# Patient Record
Sex: MALE | Race: WHITE | NOT HISPANIC OR LATINO | URBAN - METROPOLITAN AREA
[De-identification: names, ages, dates, MRNs, and addresses within clinical notes are randomized per-mention and may not be internally consistent; named-entity substitution may affect disease eponyms.]

---

## 2018-05-30 ENCOUNTER — TELEPHONE (OUTPATIENT)
Dept: SCHEDULING | Facility: CLINIC | Age: 53
End: 2018-05-30

## 2018-05-30 NOTE — TELEPHONE ENCOUNTER
Pt called to inform Dr. Schrader that Bystolic will no longer be covered under his insurance as of 7/1/2018.      Covered medications:     Atenolol  Carvedilol   Metoprolol Succinate   Metoprolol T[place]arte

## 2018-05-31 NOTE — TELEPHONE ENCOUNTER
Spoke to pt, Aware that I will speak to Dr. Schrader about this tomorrow when she is back in the office. He can refill his Bystolic until 7/1. He states this is no problem. Other options that ate covered are Nadolol and Propanolol.

## 2018-06-01 NOTE — TELEPHONE ENCOUNTER
Spoke to pt, he is aware of change. He may finish what he has of bystolic 2.5mg daily and then start Toprol 50mg daily when done.    [facility] toprol xl 50mg po daily, #90 refills 3.

## 2018-08-17 RX ORDER — METAXALONE 800 MG/1
800 TABLET ORAL 3 TIMES DAILY
COMMUNITY
Start: 2017-05-05 | End: 2018-08-17 | Stop reason: SDUPTHER

## 2018-08-20 RX ORDER — METAXALONE 800 MG/1
800 TABLET ORAL 3 TIMES DAILY
Qty: 90 TABLET | Refills: 0 | Status: SHIPPED | OUTPATIENT
Start: 2018-08-20 | End: 2018-09-23 | Stop reason: SDUPTHER

## 2018-09-25 RX ORDER — METAXALONE 800 MG/1
800 TABLET ORAL 3 TIMES DAILY
Qty: 90 TABLET | Refills: 0 | Status: SHIPPED | OUTPATIENT
Start: 2018-09-25 | End: 2018-11-03 | Stop reason: SDUPTHER

## 2018-11-05 RX ORDER — METAXALONE 800 MG/1
TABLET ORAL
Qty: 90 TABLET | Refills: 0 | Status: SHIPPED | OUTPATIENT
Start: 2018-11-05 | End: 2019-08-05

## 2018-11-29 PROBLEM — I71.20 THORACIC AORTIC ANEURYSM WITHOUT RUPTURE (CMS/HCC): Status: ACTIVE | Noted: 2018-11-29

## 2018-11-29 PROBLEM — I10 ESSENTIAL HYPERTENSION: Status: ACTIVE | Noted: 2018-11-29

## 2018-11-29 NOTE — PROGRESS NOTES
Cardiology Note    Kiah Hamilton MD          Jesus Oconnor is a 53 y.o. male 1965   Returns for follow-up visit and echocardiogram    Echocardiogram is stable normal aortic valve normal LV systolic function with  Aortic root 4.1 on today's echo was 4.3 last year no change    He has known history of dilated aortic root with CTA 2017 showing aortic root of 4.4   he is followed with Dr. Antonio due for follow-up imaging May 2019  History of hypertension    He has no known obstructive CAD with coronary calcium score of 0 in 2015    Blood pressure has not been perfect since we had a change from bystolic to metoprolol combination with amlodipine  We will check to see what the prices of bystolic return to that drug if covered if not we will continue metoprolol and increased dose of amlodipine          Patient Active Problem List    Diagnosis Date Noted   • Thoracic aortic aneurysm without rupture (CMS/HCC) (HCC) 11/29/2018   • Essential hypertension 11/29/2018       Allergy    Losartan potassium          MED LIST       Current Outpatient Prescriptions   Medication Sig Dispense Refill   • amLODIPine (NORVASC) 5 mg tablet Take 1 tablet by mouth daily.  3   • cetirizine (ZyrTEC) 10 mg tablet Take 10 mg by mouth as needed.     • metaxalone (SKELAXIN) 800 mg tablet TAKE 1 TABLET BY MOUTH THREE TIMES A DAY 90 tablet 0   • metoprolol succinate XL (TOPROL-XL) 50 mg 24 hr tablet Take 1 tablet by mouth daily.  3   • metroNIDAZOLE (METROGEL) 1 % gel Apply topically nightly.  2   • mometasone (NASONEX) 50 mcg/actuation nasal spray Administer 1 spray into affected nostril(s) daily as needed.     • PROAIR RESPICLICK 90 mcg/actuation inhaler Inhale 2 puffs every 4 (four) hours as needed.  3   • PULMICORT FLEXHALER 90 mcg/actuation inhaler Inhale 1 puff daily as needed.  5   • nebivolol (BYSTOLIC) 10 mg tablet Take 1 tablet (10 mg total) by mouth daily. 90 tablet 1     No current facility-administered medications  for this visit.    Amlodipine 5 mg  Bystolic 10 mg  Hold metoprolol 50  Metaxalone 800  Albuterol            Review of Systems   Constitution: Negative for malaise/fatigue, weight gain and weight loss.   HENT: Negative for hearing loss and hoarse voice.    Eyes: Negative for visual disturbance.   Cardiovascular: Negative for chest pain, claudication, cyanosis, dyspnea on exertion, irregular heartbeat, leg swelling, near-syncope, orthopnea, palpitations, paroxysmal nocturnal dyspnea and syncope.   Respiratory: Negative for cough, hemoptysis, shortness of breath, sleep disturbances due to breathing, snoring, sputum production and wheezing.    Endocrine: Negative for cold intolerance and heat intolerance.   Hematologic/Lymphatic: Negative.  Negative for bleeding problem. Does not bruise/bleed easily.   Skin: Negative.  Negative for rash.   Musculoskeletal: Negative for arthritis, falls, joint pain, muscle cramps and myalgias.   Gastrointestinal: Negative for abdominal pain, anorexia, change in bowel habit, constipation, diarrhea, dysphagia, heartburn, jaundice and nausea.   Genitourinary: Negative for frequency and nocturia.   Neurological: Negative for dizziness, focal weakness, headaches, light-headedness, numbness, tremors and vertigo.   Psychiatric/Behavioral: Negative for memory loss. The patient does not have insomnia and is not nervous/anxious.    Allergic/Immunologic: Negative for hives.       Labs   Lab Results   Component Value Date    WBC 6.68 02/29/2016    HGB 14.9 02/29/2016    HCT 43.4 02/29/2016     02/29/2016    CHOL 181 02/29/2016    TRIG 81 02/29/2016    HDL 58 02/29/2016    LDLCALC 107 (H) 02/29/2016    ALT 15 (L) 02/29/2016    AST 21 02/29/2016     12/16/2016    K 4.3 12/16/2016     12/16/2016    CREATININE 0.86 12/16/2016    BUN 17 02/29/2016    CO2 25 12/16/2016    TSH 3.48 02/29/2016    HGBA1C 5.0 02/29/2016       Lab Results   Component Value Date    GLUCOSE 83 12/16/2016     CALCIUM 9.4 12/16/2016     12/16/2016    K 4.3 12/16/2016    CO2 25 12/16/2016     12/16/2016    BUN 17 02/29/2016    CREATININE 0.86 12/16/2016         Objective   Vitals:    12/12/18 1537   BP: (!) 134/96   Pulse: (!) 49   Weight: 88 kg (194 lb)   Height: 1.829 m (6')     Physical Exam   Constitutional: He is oriented to person, place, and time. He appears well-developed and well-nourished. He is active.  Non-toxic appearance. He does not have a sickly appearance. He does not appear ill. No distress. He is not intubated.   HENT:   Head: Normocephalic. Not microcephalic. Head is without raccoon's eyes, without abrasion and without contusion.   Nose: Nose normal.   Eyes: EOM are normal. Left eye exhibits no discharge and no exudate. Right conjunctiva is not injected. Left conjunctiva is not injected. Left conjunctiva has no hemorrhage. No scleral icterus. Pupils are equal.   Neck: Normal range of motion. Neck supple. Normal carotid pulses and no hepatojugular reflux present. Carotid bruit is not present.   Cardiovascular: Normal rate, regular rhythm, normal heart sounds, intact distal pulses and normal pulses.  PMI is not displaced.  Exam reveals no gallop and no friction rub.    No murmur heard.  Pulmonary/Chest: Effort normal and breath sounds normal. No stridor. No apnea, no tachypnea and no bradypnea. He is not intubated. No respiratory distress. He has no wheezes. He has no rales. He exhibits no tenderness.   Abdominal: Soft. Normal appearance, normal aorta and bowel sounds are normal. He exhibits no distension. There is no tenderness.   Musculoskeletal: Normal range of motion. He exhibits no edema or deformity.   Neurological: He is alert and oriented to person, place, and time.   Skin: No abrasion, no bruising, no ecchymosis and no rash noted. He is not diaphoretic. No erythema. No pallor.   Psychiatric: He has a normal mood and affect. His mood appears not anxious. His affect is not angry, not  blunt, not labile and not inappropriate. His speech is not slurred. He is not agitated, not aggressive, not withdrawn and not combative. He does not exhibit a depressed mood. He is communicative.         Cardiac Procedures    Cardiovascular Procedures:  ECHO/MUGA:  Echo (Normal EF, ao root 3.9  redundant atrial septum) - 5/20/2016  Echo (aortic root 4.3 nl ef) - 12/18 no change      VASCULAR:  Thoracic Ao CTA (aortic rot 4.4 sinus of valsalva was 4.3 nov 2015 charley 2mm nodule no change) - 12/19/2016  CT/MRI:  Cardiac Calcium Score (Total 0:, aortic root 4.0) - 5/12/2015    Chest CT (thoracic aorta 4.3 liver cyst) - 11/2/2015    Chest CT (aortic root 4.4 ( was 4.3 11.15)no change feb 2015, 2mm charley nodule no change 2015) - 12/19/2016    Chest CT (cta 4.2 michael repeat 2 years) - 5/2017  :  EKG normal      Assessment/Plan:      Thoracic aortic aneurysm without rupture (CMS/HCC) (HCC)  Thoracic aneurysm 4.4 cm last image May 2017 follows with Dr. Antonio  Echo today stable normal aortic valve normal LV systolic function aortic root 4.1  Due for follow-up [facility] in May 2019 at [facility]  He has no known coronary disease with coronary calcium score of 0 in 2015  Blood pressure is not good enough adjusting medications outlined below    Essential hypertension  Since changing from bystolic to metoprolol blood pressure is good  He will check out the price so we will try to resume bystolic 10 and amlodipine 5  If not affordable will go back to metoprolol 50 and increase amlodipine to 10 mg follow-up in 6 weeks with lab work her blood pressure readings         Aortic root stable by echocardiogram today follow-up with CAT scan and visit with Dr. Antonio in May 2019 he is imaged every 2 years    Since we changed from bystolic to metoprolol due to cost blood pressure not as good he is here to research the cost we will try to use bystolic 10 mg in combination with amlodipine 5  If this is not affordable we will return to  metoprolol 50 increase amlodipine to 10  He will follow-up in 6 weeks we will see what he is taking and how his blood pressure is  This spring, may he will see Dr. Valdez with CTA of thoracic aorta and I will repeat echocardiogram in 1 year        Thank you for allowing me to participate in the care of this patient.  I hope this information is helpful.        Hubert Schrader MD [facility]   12/12/2018   Kiah Hamilton MD

## 2018-12-06 ENCOUNTER — TELEPHONE (OUTPATIENT)
Dept: CARDIOLOGY | Facility: CLINIC | Age: 53
End: 2018-12-06

## 2018-12-06 NOTE — TELEPHONE ENCOUNTER
I called pt to confirm ECHOOV- 12/12/18- he will be there.    He states that Dr. Schrader did not order labs at his last visit and did not have labs done recently with PCP.

## 2018-12-12 ENCOUNTER — HOSPITAL ENCOUNTER (OUTPATIENT)
Dept: CARDIOLOGY | Facility: CLINIC | Age: 53
Discharge: HOME | End: 2018-12-12
Payer: COMMERCIAL

## 2018-12-12 ENCOUNTER — OFFICE VISIT (OUTPATIENT)
Dept: CARDIOLOGY | Facility: CLINIC | Age: 53
End: 2018-12-12
Attending: INTERNAL MEDICINE
Payer: COMMERCIAL

## 2018-12-12 VITALS — BODY MASS INDEX: 25.06 KG/M2 | HEIGHT: 72 IN | WEIGHT: 185 LBS

## 2018-12-12 VITALS
SYSTOLIC BLOOD PRESSURE: 134 MMHG | DIASTOLIC BLOOD PRESSURE: 96 MMHG | WEIGHT: 194 LBS | BODY MASS INDEX: 26.28 KG/M2 | HEART RATE: 49 BPM | HEIGHT: 72 IN

## 2018-12-12 DIAGNOSIS — I10 ESSENTIAL HYPERTENSION: Primary | ICD-10-CM

## 2018-12-12 DIAGNOSIS — Z00.00 ROUTINE GENERAL MEDICAL EXAMINATION AT HEALTH CARE FACILITY: ICD-10-CM

## 2018-12-12 DIAGNOSIS — I71.20 THORACIC AORTIC ANEURYSM, WITHOUT RUPTURE: ICD-10-CM

## 2018-12-12 PROCEDURE — 93306 TTE W/DOPPLER COMPLETE: CPT | Performed by: INTERNAL MEDICINE

## 2018-12-12 PROCEDURE — 99214 OFFICE O/P EST MOD 30 MIN: CPT | Performed by: INTERNAL MEDICINE

## 2018-12-12 PROCEDURE — 93000 ELECTROCARDIOGRAM COMPLETE: CPT | Performed by: INTERNAL MEDICINE

## 2018-12-12 RX ORDER — BUDESONIDE 90 UG/1
1 AEROSOL, POWDER RESPIRATORY (INHALATION) DAILY PRN
Refills: 5 | COMMUNITY
Start: 2018-11-11 | End: 2020-10-21

## 2018-12-12 RX ORDER — AMLODIPINE BESYLATE 5 MG/1
1 TABLET ORAL 2 TIMES DAILY
Refills: 3 | COMMUNITY
Start: 2018-09-14 | End: 2019-01-24 | Stop reason: SDUPTHER

## 2018-12-12 RX ORDER — MOMETASONE FUROATE MONOHYDRATE 50 UG/1
1 SPRAY, METERED NASAL DAILY PRN
COMMUNITY
Start: 2017-12-12 | End: 2025-02-05 | Stop reason: ALTCHOICE

## 2018-12-12 RX ORDER — METRONIDAZOLE 10 MG/G
GEL TOPICAL NIGHTLY
Refills: 2 | COMMUNITY
Start: 2018-11-05 | End: 2020-10-21

## 2018-12-12 RX ORDER — METOPROLOL SUCCINATE 50 MG/1
1 TABLET, EXTENDED RELEASE ORAL DAILY
Refills: 3 | COMMUNITY
Start: 2018-09-04 | End: 2019-08-05 | Stop reason: SDUPTHER

## 2018-12-12 RX ORDER — NEBIVOLOL 10 MG/1
10 TABLET ORAL DAILY
Qty: 90 TABLET | Refills: 1 | Status: SHIPPED | OUTPATIENT
Start: 2018-12-12 | End: 2019-01-24

## 2018-12-12 RX ORDER — ALBUTEROL SULFATE 90 UG/1
2 POWDER, METERED RESPIRATORY (INHALATION) EVERY 4 HOURS PRN
Refills: 3 | COMMUNITY
Start: 2018-10-14

## 2018-12-12 RX ORDER — CETIRIZINE HYDROCHLORIDE 10 MG/1
10 TABLET ORAL AS NEEDED
COMMUNITY
Start: 2015-04-23

## 2018-12-12 ASSESSMENT — ENCOUNTER SYMPTOMS
WHEEZING: 0
WEIGHT GAIN: 0
HEADACHES: 0
COUGH: 0
NAUSEA: 0
DYSPNEA ON EXERTION: 0
HEARTBURN: 0
MEMORY LOSS: 0
WEIGHT LOSS: 0
SLEEP DISTURBANCES DUE TO BREATHING: 0
SPUTUM PRODUCTION: 0
IRREGULAR HEARTBEAT: 0
NERVOUS/ANXIOUS: 0
DIARRHEA: 0
DIZZINESS: 0
FREQUENCY: 0
JAUNDICE: 0
SYNCOPE: 0
CHANGE IN BOWEL HABIT: 0
CONSTIPATION: 0
SNORING: 0
INSOMNIA: 0
HOARSE VOICE: 0
CLAUDICATION: 0
BRUISES/BLEEDS EASILY: 0
NEAR-SYNCOPE: 0
ORTHOPNEA: 0
SHORTNESS OF BREATH: 0
NUMBNESS: 0
PALPITATIONS: 0
MUSCLE CRAMPS: 0
FALLS: 0
VERTIGO: 0
MYALGIAS: 0
LIGHT-HEADEDNESS: 0
HEMATOLOGIC/LYMPHATIC NEGATIVE: 1
TREMORS: 0
FOCAL WEAKNESS: 0
HEMOPTYSIS: 0
ABDOMINAL PAIN: 0
ANOREXIA: 0
PND: 0

## 2018-12-12 NOTE — ASSESSMENT & PLAN NOTE
Thoracic aneurysm 4.4 cm last image May 2017 follows with Dr. Antonio  Echo today stable normal aortic valve normal LV systolic function aortic root 4.1  Due for follow-up CTA in May 2019 at [facility]  He has no known coronary disease with coronary calcium score of 0 in 2015  Blood pressure is not good enough adjusting medications outlined below

## 2018-12-12 NOTE — ASSESSMENT & PLAN NOTE
Since changing from bystolic to metoprolol blood pressure is good  He will check out the price so we will try to resume bystolic 10 and amlodipine 5  If not affordable will go back to metoprolol 50 and increase amlodipine to 10 mg follow-up in 6 weeks with lab work her blood pressure readings

## 2018-12-12 NOTE — PATIENT INSTRUCTIONS
Ask price of bystolic  Of ok bystolic 10 and amlodipine 5 mg  If not affordable metorpolol and amodline will be incrreased to 10 mg   Follow 6 weeks with bp readings and lab

## 2018-12-12 NOTE — LETTER
December 12, 2018     Kiah Messina MD  [address] [facility] [address]    Patient: Jesus Oconnor   YOB: 1965   Date of Visit: 12/12/2018       Dear Delmis  Thank you for referring Jesus Oconnor to me for evaluation. Below are my notes for this consultation.    If you have questions, please do not hesitate to call me. I look forward to following your patient along with you.         Sincerely,        Hubert Schrader MD        CC: No Recipients  Hubert Schrader MD  12/12/2018  4:07 PM  Sign at close encounter      Cardiology Note    Kiah Hamilton MD          Jesus Oconnor is a 53 y.o. male 1965   Returns for follow-up visit and echocardiogram    Echocardiogram is stable normal aortic valve normal LV systolic function with  Aortic root 4.1 on today's echo was 4.3 last year no change    He has known history of dilated aortic root with CTA 2017 showing aortic root of 4.4   he is followed with Dr. Marisela angela for follow-up imaging May 2019  History of hypertension    He has no known obstructive CAD with coronary calcium score of 0 in 2015    Blood pressure has not been perfect since we had a change from bystolic to metoprolol combination with amlodipine  We will check to see what the prices of bystolic return to that drug if covered if not we will continue metoprolol and increased dose of amlodipine          Patient Active Problem List    Diagnosis Date Noted   • Thoracic aortic aneurysm without rupture (CMS/HCC) (HCC) 11/29/2018   • Essential hypertension 11/29/2018       Allergy    Losartan potassium          MED LIST       Current Outpatient Prescriptions   Medication Sig Dispense Refill   • amLODIPine (NORVASC) 5 mg tablet Take 1 tablet by mouth daily.  3   • cetirizine (ZyrTEC) 10 mg tablet Take 10 mg by mouth as needed.     • metaxalone (SKELAXIN) 800 mg tablet TAKE 1 TABLET BY MOUTH THREE TIMES A DAY 90 tablet 0   • metoprolol succinate XL  (TOPROL-XL) 50 mg 24 hr tablet Take 1 tablet by mouth daily.  3   • metroNIDAZOLE (METROGEL) 1 % gel Apply topically nightly.  2   • mometasone (NASONEX) 50 mcg/actuation nasal spray Administer 1 spray into affected nostril(s) daily as needed.     • PROAIR RESPICLICK 90 mcg/actuation inhaler Inhale 2 puffs every 4 (four) hours as needed.  3   • PULMICORT FLEXHALER 90 mcg/actuation inhaler Inhale 1 puff daily as needed.  5   • nebivolol (BYSTOLIC) 10 mg tablet Take 1 tablet (10 mg total) by mouth daily. 90 tablet 1     No current facility-administered medications for this visit.    Amlodipine 5 mg  Bystolic 10 mg  Hold metoprolol 50  Metaxalone 800  Albuterol            Review of Systems   Constitution: Negative for malaise/fatigue, weight gain and weight loss.   HENT: Negative for hearing loss and hoarse voice.    Eyes: Negative for visual disturbance.   Cardiovascular: Negative for chest pain, claudication, cyanosis, dyspnea on exertion, irregular heartbeat, leg swelling, near-syncope, orthopnea, palpitations, paroxysmal nocturnal dyspnea and syncope.   Respiratory: Negative for cough, hemoptysis, shortness of breath, sleep disturbances due to breathing, snoring, sputum production and wheezing.    Endocrine: Negative for cold intolerance and heat intolerance.   Hematologic/Lymphatic: Negative.  Negative for bleeding problem. Does not bruise/bleed easily.   Skin: Negative.  Negative for rash.   Musculoskeletal: Negative for arthritis, falls, joint pain, muscle cramps and myalgias.   Gastrointestinal: Negative for abdominal pain, anorexia, change in bowel habit, constipation, diarrhea, dysphagia, heartburn, jaundice and nausea.   Genitourinary: Negative for frequency and nocturia.   Neurological: Negative for dizziness, focal weakness, headaches, light-headedness, numbness, tremors and vertigo.   Psychiatric/Behavioral: Negative for memory loss. The patient does not have insomnia and is not nervous/anxious.     Allergic/Immunologic: Negative for hives.       Labs   Lab Results   Component Value Date    WBC 6.68 02/29/2016    HGB 14.9 02/29/2016    HCT 43.4 02/29/2016     02/29/2016    CHOL 181 02/29/2016    TRIG 81 02/29/2016    HDL 58 02/29/2016    LDLCALC 107 (H) 02/29/2016    ALT 15 (L) 02/29/2016    AST 21 02/29/2016     12/16/2016    K 4.3 12/16/2016     12/16/2016    CREATININE 0.86 12/16/2016    BUN 17 02/29/2016    CO2 25 12/16/2016    TSH 3.48 02/29/2016    HGBA1C 5.0 02/29/2016       Lab Results   Component Value Date    GLUCOSE 83 12/16/2016    CALCIUM 9.4 12/16/2016     12/16/2016    K 4.3 12/16/2016    CO2 25 12/16/2016     12/16/2016    BUN 17 02/29/2016    CREATININE 0.86 12/16/2016         Objective   Vitals:    12/12/18 1537   BP: (!) 134/96   Pulse: (!) 49   Weight: 88 kg (194 lb)   Height: 1.829 m (6')     Physical Exam   Constitutional: He is oriented to person, place, and time. He appears well-developed and well-nourished. He is active.  Non-toxic appearance. He does not have a sickly appearance. He does not appear ill. No distress. He is not intubated.   HENT:   Head: Normocephalic. Not microcephalic. Head is without raccoon's eyes, without abrasion and without contusion.   Nose: Nose normal.   Eyes: EOM are normal. Left eye exhibits no discharge and no exudate. Right conjunctiva is not injected. Left conjunctiva is not injected. Left conjunctiva has no hemorrhage. No scleral icterus. Pupils are equal.   Neck: Normal range of motion. Neck supple. Normal carotid pulses and no hepatojugular reflux present. Carotid bruit is not present.   Cardiovascular: Normal rate, regular rhythm, normal heart sounds, intact distal pulses and normal pulses.  PMI is not displaced.  Exam reveals no gallop and no friction rub.    No murmur heard.  Pulmonary/Chest: Effort normal and breath sounds normal. No stridor. No apnea, no tachypnea and no bradypnea. He is not intubated. No  respiratory distress. He has no wheezes. He has no rales. He exhibits no tenderness.   Abdominal: Soft. Normal appearance, normal aorta and bowel sounds are normal. He exhibits no distension. There is no tenderness.   Musculoskeletal: Normal range of motion. He exhibits no edema or deformity.   Neurological: He is alert and oriented to person, place, and time.   Skin: No abrasion, no bruising, no ecchymosis and no rash noted. He is not diaphoretic. No erythema. No pallor.   Psychiatric: He has a normal mood and affect. His mood appears not anxious. His affect is not angry, not blunt, not labile and not inappropriate. His speech is not slurred. He is not agitated, not aggressive, not withdrawn and not combative. He does not exhibit a depressed mood. He is communicative.         Cardiac Procedures    Cardiovascular Procedures:  ECHO/MUGA:  Echo (Normal EF, ao root 3.9  redundant atrial septum) - 5/20/2016  Echo (aortic root 4.3 nl ef) - 12/18 no change      VASCULAR:  Thoracic Ao CTA (aortic rot 4.4 sinus of valsalva was 4.3 nov 2015 charley 2mm nodule no change) - 12/19/2016  CT/MRI:  Cardiac Calcium Score (Total 0:, aortic root 4.0) - 5/12/2015    Chest CT (thoracic aorta 4.3 liver cyst) - 11/2/2015    Chest CT (aortic root 4.4 ( was 4.3 11.15)no change feb 2015, 2mm charley nodule no change 2015) - 12/19/2016    Chest CT (cta 4.2 michael repeat 2 years) - 5/2017  :  EKG normal      Assessment/Plan:      Thoracic aortic aneurysm without rupture (CMS/HCC) (HCC)  Thoracic aneurysm 4.4 cm last image May 2017 follows with Dr. Antonio  Echo today stable normal aortic valve normal LV systolic function aortic root 4.1  Due for follow-up CTA in May 2019 at [facility]  He has no known coronary disease with coronary calcium score of 0 in 2015  Blood pressure is not good enough adjusting medications outlined below    Essential hypertension  Since changing from bystolic to metoprolol blood pressure is good  He will check  out the price so we will try to resume bystolic 10 and amlodipine 5  If not affordable will go back to metoprolol 50 and increase amlodipine to 10 mg follow-up in 6 weeks with lab work her blood pressure readings         Aortic root stable by echocardiogram today follow-up with CAT scan and visit with Dr. Valdez in May 2019 he is imaged every 2 years    Since we changed from bystolic to metoprolol due to cost blood pressure not as good he is here to research the cost we will try to use bystolic 10 mg in combination with amlodipine 5  If this is not affordable we will return to metoprolol 50 increase amlodipine to 10  He will follow-up in 6 weeks we will see what he is taking and how his blood pressure is  This spring, may he will see Dr. Valdez with CTA of thoracic aorta and I will repeat echocardiogram in 1 year        Thank you for allowing me to participate in the care of this patient.  I hope this information is helpful.        Hubert Schrader MD [facility]   12/12/2018  Kiah York MD

## 2018-12-13 LAB
AORTIC ROOT ANNULUS: 4.1 CM
AORTIC VALVE AREA: 4.23 CM2
AORTIC VALVE MEAN VELOCITY: 0.75 M/S
AORTIC VALVE VELOCITY TIME INTEGRAL: 22.43 CM
ASCENDING AORTA: 3.97 CM
AV MEAN GRADIENT: 2.51 MMHG
AV PEAK GRADIENT: 4.38 MMHG
AV PEAK VELOCITY-S: 1.04 M/S
AV VALVE AREA: 4.01 CM2
BSA FOR ECHO PROCEDURE: 2.06 M2
CUSP SEPARATION: 2.14 CM
DOP CALC LVOT STROKE VOLUME: 86.77 ML
DOP CALC RVOT VTI: 12.1 CM
E WAVE DECELERATION TIME: 481.73 MS
E/A RATIO: 1.18
E/E' RATIO: 6
EDV (BP): 91.56 ML
EF (A4C): 67.98 %
EF A2C: 67.14 %
EJECTION FRACTION: 68.26 %
EST RIGHT VENT SYSTOLIC PRESSURE BY TRICUSPID REGURGITATION JET: 20 MMHG
ESV (BP): 29.06 ML
INTERVENTRICULAR SEPTUM: 0.83 CM
LA ESV (BP): 70.89 ML
LA ESV INDEX (A2C): 38.22 ML/M2
LA ESV INDEX (BP): 34.41 ML/M2
LA/AORTA RATIO: 1.07
LAD 2D - M-MODE: 3.58 CM
LAD 2D: 3.6 CM
LAV-S: 78.74 ML
LEFT ATRIUM VOLUME INDEX: 25.15 ML/M2
LEFT ATRIUM VOLUME: 51.8 ML
LEFT INTERNAL DIMENSION IN SYSTOLE: 3.26 CM (ref 3.25–4.93)
LEFT VENTRICLE DIASTOLIC VOLUME INDEX: 47.33 ML/M2
LEFT VENTRICLE DIASTOLIC VOLUME: 97.49 ML
LEFT VENTRICLE SYSTOLIC VOLUME INDEX: 15.16 ML/M2
LEFT VENTRICLE SYSTOLIC VOLUME: 31.22 ML
LEFT VENTRICULAR INTERNAL DIMENSION IN DIASTOLE: 5.33 CM (ref 5.56–7.72)
LV DIASTOLIC VOLUME: 82 ML
LV ESV (APICAL 2 CHAMBER): 27.05 ML
LVEDVI(A2C): 39.81 ML/M2
LVEDVI(BP): 44.45 ML/M2
LVESVI(A2C): 13.13 ML/M2
LVESVI(BP): 14.11 ML/M2
LVOT 2D: 2.37 CM
LVOT MG: 1.84 MMHG
LVOT MV: 0.64 M/S
LVOT PEAK VELOCITY: 0.95 M/S
LVOT PG: 3.63 MMHG
LVOT VTI: 19.68 CM
MV E'TISSUE VEL-LAT: 0.13 M/S
MV E'TISSUE VEL-MED: 0.08 M/S
MV PEAK A VEL: 0.41 M/S
MV PEAK E VEL: 0.49 M/S
MV STENOSIS PRESSURE HALF TIME: 139.7 MS
MV VALVE AREA P 1/2 METHOD: 1.57 CM2
POSTERIOR WALL: 1 CM
RVOT VMAX: 0.54 M/S
TR MAX PG: 16.7 MMHG
TRICUSPID VALVE PEAK REGURGITATION VELOCITY: 2.04 M/S
Z-SCORE OF LEFT VENTRICULAR DIMENSION IN END DIASTOLE: -2.07
Z-SCORE OF LEFT VENTRICULAR DIMENSION IN END SYSTOLE: -1.62

## 2019-01-17 LAB
ALBUMIN SERPL-MCNC: 4.5 G/DL (ref 3.6–5.1)
ALBUMIN/GLOB SERPL: 1.6 (CALC) (ref 1–2.5)
ALP SERPL-CCNC: 47 U/L (ref 40–115)
ALT SERPL-CCNC: 11 U/L (ref 9–46)
AST SERPL-CCNC: 18 U/L (ref 10–35)
BASOPHILS # BLD AUTO: 40 CELLS/UL (ref 0–200)
BASOPHILS NFR BLD AUTO: 0.8 %
BILIRUB SERPL-MCNC: 0.8 MG/DL (ref 0.2–1.2)
BUN SERPL-MCNC: 10 MG/DL (ref 7–25)
BUN/CREAT SERPL: ABNORMAL (CALC) (ref 6–22)
CALCIUM SERPL-MCNC: 9.3 MG/DL (ref 8.6–10.3)
CHLORIDE SERPL-SCNC: 100 MMOL/L (ref 98–110)
CHOLEST SERPL-MCNC: 185 MG/DL
CHOLEST/HDLC SERPL: 3.2 (CALC)
CO2 SERPL-SCNC: 28 MMOL/L (ref 20–32)
CREAT SERPL-MCNC: 0.88 MG/DL (ref 0.7–1.33)
EOSINOPHIL # BLD AUTO: 495 CELLS/UL (ref 15–500)
EOSINOPHIL NFR BLD AUTO: 9.9 %
ERYTHROCYTE [DISTWIDTH] IN BLOOD BY AUTOMATED COUNT: 12.3 % (ref 11–15)
GFR SERPL CREATININE-BSD FRML MDRD: 97 ML/MIN/1.73M2
GLOBULIN SER CALC-MCNC: 2.9 G/DL (CALC) (ref 1.9–3.7)
GLUCOSE SERPL-MCNC: 80 MG/DL (ref 65–99)
HCT VFR BLD AUTO: 44 % (ref 38.5–50)
HDLC SERPL-MCNC: 58 MG/DL
HGB BLD-MCNC: 15.2 G/DL (ref 13.2–17.1)
LDLC SERPL CALC-MCNC: 107 MG/DL (CALC)
LYMPHOCYTES # BLD AUTO: 1440 CELLS/UL (ref 850–3900)
LYMPHOCYTES NFR BLD AUTO: 28.8 %
MCH RBC QN AUTO: 31.6 PG (ref 27–33)
MCHC RBC AUTO-ENTMCNC: 34.5 G/DL (ref 32–36)
MCV RBC AUTO: 91.5 FL (ref 80–100)
MONOCYTES # BLD AUTO: 595 CELLS/UL (ref 200–950)
MONOCYTES NFR BLD AUTO: 11.9 %
NEUTROPHILS # BLD AUTO: 2430 CELLS/UL (ref 1500–7800)
NEUTROPHILS NFR BLD AUTO: 48.6 %
NONHDLC SERPL-MCNC: 127 MG/DL (CALC)
PLATELET # BLD AUTO: 205 THOUSAND/UL (ref 140–400)
PMV BLD REES-ECKER: 12.2 FL (ref 7.5–12.5)
POTASSIUM SERPL-SCNC: 4.4 MMOL/L (ref 3.5–5.3)
PROT SERPL-MCNC: 7.4 G/DL (ref 6.1–8.1)
RBC # BLD AUTO: 4.81 MILLION/UL (ref 4.2–5.8)
SODIUM SERPL-SCNC: 134 MMOL/L (ref 135–146)
TRIGL SERPL-MCNC: 100 MG/DL
TSH SERPL-ACNC: 3.71 MIU/L (ref 0.4–4.5)
WBC # BLD AUTO: 5 THOUSAND/UL (ref 3.8–10.8)

## 2019-01-18 ENCOUNTER — TELEPHONE (OUTPATIENT)
Dept: CARDIOLOGY | Facility: CLINIC | Age: 54
End: 2019-01-18

## 2019-01-24 ENCOUNTER — OFFICE VISIT (OUTPATIENT)
Dept: CARDIOLOGY | Facility: CLINIC | Age: 54
End: 2019-01-24
Payer: COMMERCIAL

## 2019-01-24 VITALS
DIASTOLIC BLOOD PRESSURE: 78 MMHG | HEIGHT: 72 IN | WEIGHT: 194 LBS | BODY MASS INDEX: 26.28 KG/M2 | SYSTOLIC BLOOD PRESSURE: 102 MMHG | OXYGEN SATURATION: 98 % | HEART RATE: 62 BPM

## 2019-01-24 DIAGNOSIS — I71.20 THORACIC AORTIC ANEURYSM WITHOUT RUPTURE (CMS/HCC): Primary | ICD-10-CM

## 2019-01-24 DIAGNOSIS — I10 ESSENTIAL HYPERTENSION: ICD-10-CM

## 2019-01-24 PROBLEM — E78.2 MIXED HYPERLIPIDEMIA: Status: ACTIVE | Noted: 2019-01-24

## 2019-01-24 PROBLEM — R06.9 ABNORMAL BREATHING: Status: ACTIVE | Noted: 2019-01-24

## 2019-01-24 PROCEDURE — 99213 OFFICE O/P EST LOW 20 MIN: CPT | Performed by: INTERNAL MEDICINE

## 2019-01-24 RX ORDER — AMLODIPINE BESYLATE 5 MG/1
5 TABLET ORAL 2 TIMES DAILY
Qty: 90 TABLET | Refills: 3 | Status: SHIPPED | OUTPATIENT
Start: 2019-01-24 | End: 2019-08-05 | Stop reason: SDUPTHER

## 2019-01-24 ASSESSMENT — ENCOUNTER SYMPTOMS
SHORTNESS OF BREATH: 0
WEIGHT LOSS: 0
CHANGE IN BOWEL HABIT: 0
FOCAL WEAKNESS: 0
HEADACHES: 0
COUGH: 0
NEAR-SYNCOPE: 0
ORTHOPNEA: 0
FREQUENCY: 0
HEARTBURN: 0
NAUSEA: 0
PALPITATIONS: 0
DIZZINESS: 0
WEIGHT GAIN: 0
HEMOPTYSIS: 0
MEMORY LOSS: 0
IRREGULAR HEARTBEAT: 0
VERTIGO: 0
SYNCOPE: 0
LIGHT-HEADEDNESS: 0
ABDOMINAL PAIN: 0
BRUISES/BLEEDS EASILY: 0
DYSPNEA ON EXERTION: 0
NUMBNESS: 0
DIARRHEA: 0
ANOREXIA: 0
JAUNDICE: 0
HOARSE VOICE: 0
CONSTIPATION: 0
PND: 0
TREMORS: 0
WHEEZING: 0
SNORING: 0
INSOMNIA: 0
CLAUDICATION: 0
SPUTUM PRODUCTION: 0
MUSCLE CRAMPS: 0
HEMATOLOGIC/LYMPHATIC NEGATIVE: 1
NERVOUS/ANXIOUS: 0
SLEEP DISTURBANCES DUE TO BREATHING: 0
FALLS: 0
MYALGIAS: 0

## 2019-01-24 NOTE — ASSESSMENT & PLAN NOTE
His wife thinks he has signs of sleep apnea with irregular breathing at night he will follow-up with you to see if you think study is indicated

## 2019-01-24 NOTE — LETTER
January 24, 2019     Kiah Messina MD  [address] [facility] [address]    Patient: Jesus Oconnor   YOB: 1965   Date of Visit: 1/24/2019       Dear Delmis  Thank you for referring Jesus Oconnor to me for evaluation. Below are my notes for this consultation.    If you have questions, please do not hesitate to call me. I look forward to following your patient along with you.         Sincerely,        Hubert Schrader MD        CC: MD Raciel Hansen, Hubert BOSCH MD  1/24/2019  2:22 PM  Sign at close encounter      Cardiology Note    Kiah Hamilton MD          Jesus Oconnor is a 53 y.o. male 1965     He returns for follow-up  Check his blood pressure he had been on bystolic with amlodipine but it was no longer covered by his insurance company I changed him to metoprolol and increased amlodipine  Blood pressure from his grade 110/80 says occasionally goes to 140 at home but I did not make any adjustments today  He has a thoracic aneurysm    he follows with Dr. Valdez at [facility]  Last CTA May 2017 aortic root was 4.2 and he is due to follow-up in 2 years which would be spring 2019    He has no known obstructive CAD with coronary calcium score of 0 in 2015  He is treated for hypertension  He has a structurally normal trileaflet aortic valve  His last echocardiogram was December 2018 aortic root 4.3 by echo images and preserved LV systolic function structurally normal aortic valve      ease alpful.    Patient Active Problem List    Diagnosis Date Noted   • Abnormal breathing 01/24/2019   • Mixed hyperlipidemia 01/24/2019   • Thoracic aortic aneurysm without rupture (CMS/HCC) (HCC) 11/29/2018   • Essential hypertension 11/29/2018       Allergy    Losartan potassium          MED LIST       Current Outpatient Prescriptions   Medication Sig Dispense Refill   • amLODIPine (NORVASC) 5 mg tablet Take 1 tablet (5 mg total) by mouth 2 (two)  times a day. 90 tablet 3   • cetirizine (ZyrTEC) 10 mg tablet Take 10 mg by mouth as needed.     • metaxalone (SKELAXIN) 800 mg tablet TAKE 1 TABLET BY MOUTH THREE TIMES A DAY 90 tablet 0   • metoprolol succinate XL (TOPROL-XL) 50 mg 24 hr tablet Take 1 tablet by mouth daily.  3   • metroNIDAZOLE (METROGEL) 1 % gel Apply topically nightly.  2   • mometasone (NASONEX) 50 mcg/actuation nasal spray Administer 1 spray into affected nostril(s) daily as needed.     • PROAIR RESPICLICK 90 mcg/actuation inhaler Inhale 2 puffs every 4 (four) hours as needed.  3   • PULMICORT FLEXHALER 90 mcg/actuation inhaler Inhale 1 puff daily as needed.  5     No current facility-administered medications for this visit.                 Review of Systems   Constitution: Negative for malaise/fatigue, weight gain and weight loss.   HENT: Negative for hearing loss and hoarse voice.    Eyes: Negative for visual disturbance.   Cardiovascular: Negative for chest pain, claudication, cyanosis, dyspnea on exertion, irregular heartbeat, leg swelling, near-syncope, orthopnea, palpitations, paroxysmal nocturnal dyspnea and syncope.   Respiratory: Negative for cough, hemoptysis, shortness of breath, sleep disturbances due to breathing, snoring, sputum production and wheezing.         Abnormal breathing at night according to his wife   Endocrine: Negative for cold intolerance and heat intolerance.   Hematologic/Lymphatic: Negative.  Negative for bleeding problem. Does not bruise/bleed easily.   Skin: Negative.  Negative for rash.   Musculoskeletal: Negative for arthritis, falls, joint pain, muscle cramps and myalgias.   Gastrointestinal: Negative for abdominal pain, anorexia, change in bowel habit, constipation, diarrhea, dysphagia, heartburn, jaundice and nausea.   Genitourinary: Negative for frequency and nocturia.   Neurological: Negative for dizziness, focal weakness, headaches, light-headedness, numbness, tremors and vertigo.    Psychiatric/Behavioral: Negative for memory loss. The patient does not have insomnia and is not nervous/anxious.    Allergic/Immunologic: Negative for hives.       Labs   Lab Results   Component Value Date    WBC 5.0 01/16/2019    HGB 15.2 01/16/2019    HCT 44.0 01/16/2019     01/16/2019    CHOL 185 01/16/2019    TRIG 100 01/16/2019    HDL 58 01/16/2019    LDLCALC 107 (H) 01/16/2019    ALT 11 01/16/2019    AST 18 01/16/2019     (L) 01/16/2019    K 4.4 01/16/2019     01/16/2019    CREATININE 0.88 01/16/2019    BUN 17 02/29/2016    CO2 28 01/16/2019    TSH 3.71 01/16/2019    HGBA1C 5.0 02/29/2016       Lab Results   Component Value Date    GLUCOSE 80 01/16/2019    CALCIUM 9.3 01/16/2019     (L) 01/16/2019    K 4.4 01/16/2019    CO2 28 01/16/2019     01/16/2019    BUN 17 02/29/2016    CREATININE 0.88 01/16/2019         Objective   Vitals:    01/24/19 1401   BP: 102/78   Pulse: 62   SpO2: 98%   Weight: 88 kg (194 lb)   Height: 1.829 m (6')     Physical Exam   Constitutional: He is oriented to person, place, and time. He appears well-developed and well-nourished. He is active.  Non-toxic appearance. He does not have a sickly appearance. He does not appear ill. No distress. He is not intubated.   HENT:   Head: Normocephalic. Not microcephalic. Head is without raccoon's eyes, without abrasion and without contusion.   Nose: Nose normal.   Eyes: EOM are normal. Left eye exhibits no discharge and no exudate. Right conjunctiva is not injected. Left conjunctiva is not injected. Left conjunctiva has no hemorrhage. No scleral icterus. Pupils are equal.   Neck: Normal range of motion. Neck supple. Normal carotid pulses and no hepatojugular reflux present. Carotid bruit is not present.   Cardiovascular: Normal rate, regular rhythm, normal heart sounds, intact distal pulses and normal pulses.  PMI is not displaced.  Exam reveals no gallop and no friction rub.    No murmur heard.  Pulmonary/Chest:  Effort normal and breath sounds normal. No stridor. No apnea, no tachypnea and no bradypnea. He is not intubated. No respiratory distress. He has no wheezes. He has no rales. He exhibits no tenderness.   Abdominal: Soft. Normal appearance, normal aorta and bowel sounds are normal. He exhibits no distension. There is no tenderness.   Musculoskeletal: Normal range of motion. He exhibits no edema or deformity.   Neurological: He is alert and oriented to person, place, and time.   Skin: No abrasion, no bruising, no ecchymosis and no rash noted. He is not diaphoretic. No erythema. No pallor.   Psychiatric: He has a normal mood and affect. His mood appears not anxious. His affect is not angry, not blunt, not labile and not inappropriate. His speech is not slurred. He is not agitated, not aggressive, not withdrawn and not combative. He does not exhibit a depressed mood. He is communicative.         Cardiac Procedures  Procedures     Cardiovascular Procedures:  ECHO/MUGA:  Echo (Normal EF, ao root 3.9  redundant atrial septum) - 5/20/2016    Echo (aortic root 4.3 nl ef) - 12/18 no change        VASCULAR:    CT/MRI:  Cardiac Calcium Score (Total 0:, aortic root 4.0) - 5/12/2015     Chest CT (thoracic aorta 4.3 liver cyst) - 11/2/2015     Chest CT (aortic root 4.4 ( was 4.3 11.15)no change feb 2015, 2mm charley nodule no change 2015) - 12/19/2016     Chest CT (cta 4.2 michael repeat 2 years) - 5/2017  :      EKG      Assessment/Plan:      Thoracic aortic aneurysm without rupture (CMS/HCC) (HCC)  Follows with Dr. Antonio last CT angiogram May 2017 stable 4.4 he is due May 2019 repeat images and follow-up  He had echocardiogram December 2018 4.3 normal aortic valve preserved LV systolic function    Essential hypertension  We had to instruct his medication because bystolic was not covered by his insurance company  Placed him on metoprolol 50 increase amlodipine to 5 twice daily blood pressure for me is been good he checks it at home  he says rarely goes to 140 I made no changes today blood pressure creeps up we will add diuretic therapy    Abnormal breathing  His wife thinks he has signs of sleep apnea with irregular breathing at night he will follow-up with you to see if you think study is indicated    Mixed hyperlipidemia  LDL cholesterol minimally elevated 107 coronary calcium score 0 2015 no indication for statin therapy         No change in blood pressure medications at this time if creeps up would add hydrochlorothiazide  He is following up with Dr. Antonio and CT of thoracic aorta in May 2019 he gets imaged every 2 years  I will recheck echocardiogram in January 2020  Follow-up with lab work in 6 months    Thank you for allowing me to participate in the care of this patient.  I hope this information is helpful.    Hubert Schrader MD [facility]   1/24/2019  Cc Kiah Hamilton MD

## 2019-01-24 NOTE — ASSESSMENT & PLAN NOTE
LDL cholesterol minimally elevated 107 coronary calcium score 0 2015 no indication for statin therapy

## 2019-01-24 NOTE — PROGRESS NOTES
Cardiology Note    Kiah Hamilton MD          Jesus Oconnor is a 53 y.o. male 1965     He returns for follow-up  Check his blood pressure he had been on bystolic with amlodipine but it was no longer covered by his insurance company I changed him to metoprolol and increased amlodipine  Blood pressure from his grade 110/80 says occasionally goes to 140 at home but I did not make any adjustments today  He has a thoracic aneurysm    he follows with Dr. Valdez at [facility]  Last CTA May 2017 aortic root was 4.2 and he is due to follow-up in 2 years which would be spring 2019    He has no known obstructive CAD with coronary calcium score of 0 in 2015  He is treated for hypertension  He has a structurally normal trileaflet aortic valve  His last echocardiogram was December 2018 aortic root 4.3 by echo images and preserved LV systolic function structurally normal aortic valve      ease alpful.    Patient Active Problem List    Diagnosis Date Noted   • Abnormal breathing 01/24/2019   • Mixed hyperlipidemia 01/24/2019   • Thoracic aortic aneurysm without rupture (CMS/HCC) ([facility]) 11/29/2018   • Essential hypertension 11/29/2018       Allergy    Losartan potassium          MED LIST       Current Outpatient Prescriptions   Medication Sig Dispense Refill   • amLODIPine (NORVASC) 5 mg tablet Take 1 tablet (5 mg total) by mouth 2 (two) times a day. 90 tablet 3   • cetirizine (ZyrTEC) 10 mg tablet Take 10 mg by mouth as needed.     • metaxalone (SKELAXIN) 800 mg tablet TAKE 1 TABLET BY MOUTH THREE TIMES A DAY 90 tablet 0   • metoprolol succinate XL (TOPROL-XL) 50 mg 24 hr tablet Take 1 tablet by mouth daily.  3   • metroNIDAZOLE (METROGEL) 1 % gel Apply topically nightly.  2   • mometasone (NASONEX) 50 mcg/actuation nasal spray Administer 1 spray into affected nostril(s) daily as needed.     • PROAIR RESPICLICK 90 mcg/actuation inhaler Inhale 2 puffs every 4 (four) hours as needed.  3   •  PULMICORT FLEXHALER 90 mcg/actuation inhaler Inhale 1 puff daily as needed.  5     No current facility-administered medications for this visit.                 Review of Systems   Constitution: Negative for malaise/fatigue, weight gain and weight loss.   HENT: Negative for hearing loss and hoarse voice.    Eyes: Negative for visual disturbance.   Cardiovascular: Negative for chest pain, claudication, cyanosis, dyspnea on exertion, irregular heartbeat, leg swelling, near-syncope, orthopnea, palpitations, paroxysmal nocturnal dyspnea and syncope.   Respiratory: Negative for cough, hemoptysis, shortness of breath, sleep disturbances due to breathing, snoring, sputum production and wheezing.         Abnormal breathing at night according to his wife   Endocrine: Negative for cold intolerance and heat intolerance.   Hematologic/Lymphatic: Negative.  Negative for bleeding problem. Does not bruise/bleed easily.   Skin: Negative.  Negative for rash.   Musculoskeletal: Negative for arthritis, falls, joint pain, muscle cramps and myalgias.   Gastrointestinal: Negative for abdominal pain, anorexia, change in bowel habit, constipation, diarrhea, dysphagia, heartburn, jaundice and nausea.   Genitourinary: Negative for frequency and nocturia.   Neurological: Negative for dizziness, focal weakness, headaches, light-headedness, numbness, tremors and vertigo.   Psychiatric/Behavioral: Negative for memory loss. The patient does not have insomnia and is not nervous/anxious.    Allergic/Immunologic: Negative for hives.       Labs   Lab Results   Component Value Date    WBC 5.0 01/16/2019    HGB 15.2 01/16/2019    HCT 44.0 01/16/2019     01/16/2019    CHOL 185 01/16/2019    TRIG 100 01/16/2019    HDL 58 01/16/2019    LDLCALC 107 (H) 01/16/2019    ALT 11 01/16/2019    AST 18 01/16/2019     (L) 01/16/2019    K 4.4 01/16/2019     01/16/2019    CREATININE 0.88 01/16/2019    BUN 17 02/29/2016    CO2 28 01/16/2019    TSH  3.71 01/16/2019    HGBA1C 5.0 02/29/2016       Lab Results   Component Value Date    GLUCOSE 80 01/16/2019    CALCIUM 9.3 01/16/2019     (L) 01/16/2019    K 4.4 01/16/2019    CO2 28 01/16/2019     01/16/2019    BUN 17 02/29/2016    CREATININE 0.88 01/16/2019         Objective   Vitals:    01/24/19 1401   BP: 102/78   Pulse: 62   SpO2: 98%   Weight: 88 kg (194 lb)   Height: 1.829 m (6')     Physical Exam   Constitutional: He is oriented to person, place, and time. He appears well-developed and well-nourished. He is active.  Non-toxic appearance. He does not have a sickly appearance. He does not appear ill. No distress. He is not intubated.   HENT:   Head: Normocephalic. Not microcephalic. Head is without raccoon's eyes, without abrasion and without contusion.   Nose: Nose normal.   Eyes: EOM are normal. Left eye exhibits no discharge and no exudate. Right conjunctiva is not injected. Left conjunctiva is not injected. Left conjunctiva has no hemorrhage. No scleral icterus. Pupils are equal.   Neck: Normal range of motion. Neck supple. Normal carotid pulses and no hepatojugular reflux present. Carotid bruit is not present.   Cardiovascular: Normal rate, regular rhythm, normal heart sounds, intact distal pulses and normal pulses.  PMI is not displaced.  Exam reveals no gallop and no friction rub.    No murmur heard.  Pulmonary/Chest: Effort normal and breath sounds normal. No stridor. No apnea, no tachypnea and no bradypnea. He is not intubated. No respiratory distress. He has no wheezes. He has no rales. He exhibits no tenderness.   Abdominal: Soft. Normal appearance, normal aorta and bowel sounds are normal. He exhibits no distension. There is no tenderness.   Musculoskeletal: Normal range of motion. He exhibits no edema or deformity.   Neurological: He is alert and oriented to person, place, and time.   Skin: No abrasion, no bruising, no ecchymosis and no rash noted. He is not diaphoretic. No erythema. No  pallor.   Psychiatric: He has a normal mood and affect. His mood appears not anxious. His affect is not angry, not blunt, not labile and not inappropriate. His speech is not slurred. He is not agitated, not aggressive, not withdrawn and not combative. He does not exhibit a depressed mood. He is communicative.         Cardiac Procedures  Procedures     Cardiovascular Procedures:  ECHO/MUGA:  Echo (Normal EF, ao root 3.9  redundant atrial septum) - 5/20/2016    Echo (aortic root 4.3 nl ef) - 12/18 no change        VASCULAR:    CT/MRI:  Cardiac Calcium Score (Total 0:, aortic root 4.0) - 5/12/2015     Chest CT (thoracic aorta 4.3 liver cyst) - 11/2/2015     Chest CT (aortic root 4.4 ( was 4.3 11.15)no change feb 2015, 2mm charley nodule no change 2015) - 12/19/2016     Chest CT (cta 4.2 michael repeat 2 years) - 5/2017  :      EKG      Assessment/Plan:      Thoracic aortic aneurysm without rupture (CMS/HCC) (HCC)  Follows with Dr. Antonio last CT angiogram May 2017 stable 4.4 he is due May 2019 repeat images and follow-up  He had echocardiogram December 2018 4.3 normal aortic valve preserved LV systolic function    Essential hypertension  We had to instruct his medication because bystolic was not covered by his insurance company  Placed him on metoprolol 50 increase amlodipine to 5 twice daily blood pressure for me is been good he checks it at home he says rarely goes to 140 I made no changes today blood pressure creeps up we will add diuretic therapy    Abnormal breathing  His wife thinks he has signs of sleep apnea with irregular breathing at night he will follow-up with you to see if you think study is indicated    Mixed hyperlipidemia  LDL cholesterol minimally elevated 107 coronary calcium score 0 2015 no indication for statin therapy         No change in blood pressure medications at this time if creeps up would add hydrochlorothiazide  He is following up with Dr. Antonio and CT of thoracic aorta in May 2019 he gets  imaged every 2 years  I will recheck echocardiogram in January 2020  Follow-up with lab work in 6 months    Thank you for allowing me to participate in the care of this patient.  I hope this information is helpful.    Hubert Schrader MD [facility]   1/24/2019  Cc Kiah Hamilton MD

## 2019-01-24 NOTE — ASSESSMENT & PLAN NOTE
We had to instruct his medication because bystolic was not covered by his insurance company  Placed him on metoprolol 50 increase amlodipine to 5 twice daily blood pressure for me is been good he checks it at home he says rarely goes to 140 I made no changes today blood pressure creeps up we will add diuretic therapy

## 2019-01-24 NOTE — ASSESSMENT & PLAN NOTE
Follows with Dr. Antonio last CT angiogram May 2017 stable 4.4 he is due May 2019 repeat images and follow-up  He had echocardiogram December 2018 4.3 normal aortic valve preserved LV systolic function

## 2019-07-22 ENCOUNTER — TELEPHONE (OUTPATIENT)
Dept: CARDIOLOGY | Facility: CLINIC | Age: 54
End: 2019-07-22

## 2019-07-22 NOTE — TELEPHONE ENCOUNTER
I called and spoke to confirm OV -- 7/25/19- Mariehy.    Pt needed to reschedule. He is scheduled for 8/5/19- [facility].    I mailed appt reminder and lab slip to pt's home.

## 2019-07-31 ASSESSMENT — ENCOUNTER SYMPTOMS
MYALGIAS: 0
HEADACHES: 0
JAUNDICE: 0
SPUTUM PRODUCTION: 0
MUSCLE CRAMPS: 0
ANOREXIA: 0
WEIGHT LOSS: 0
TREMORS: 0
DYSPNEA ON EXERTION: 0
WEIGHT GAIN: 0
CONSTIPATION: 0
NERVOUS/ANXIOUS: 0
HEARTBURN: 0
MEMORY LOSS: 0
DIARRHEA: 0
SNORING: 0
DIZZINESS: 0
FOCAL WEAKNESS: 0
NUMBNESS: 0
PND: 0
CLAUDICATION: 0
HEMOPTYSIS: 0
INSOMNIA: 0
HEMATOLOGIC/LYMPHATIC NEGATIVE: 1
FREQUENCY: 0
NAUSEA: 0
NEAR-SYNCOPE: 0
CHANGE IN BOWEL HABIT: 0
WHEEZING: 0
IRREGULAR HEARTBEAT: 0
SLEEP DISTURBANCES DUE TO BREATHING: 0
ABDOMINAL PAIN: 0
HOARSE VOICE: 0
COUGH: 0
PALPITATIONS: 0
BRUISES/BLEEDS EASILY: 0
SHORTNESS OF BREATH: 0
LIGHT-HEADEDNESS: 0
VERTIGO: 0
SYNCOPE: 0
ORTHOPNEA: 0
FALLS: 0

## 2019-07-31 NOTE — PROGRESS NOTES
Cardiology Note    Kiah Hamilton MD          Jesus Oconnor is a 53 y.o. male 1965     He returns for follow-up of his thoracic aneurysm  He had CT angiogram in May 2017 aortic root 4.4 he follows with Dr. Antonio  He was told to follow-up in 2 years, which is now    Last echocardiogram December 2018 preserved LV systolic function normal aortic valve aortic root 4.3 stable  He has no known obstructive CAD with coronary calcium score of 0 in 2015  He has minimally elevated cholesterol with LDL of 100  And hypertension  He complains of mild edema in his hands sometimes after walking that resolves quickly it is tolerable, probably related to amlodipine I made no changes today         ease alpful.    Patient Active Problem List    Diagnosis Date Noted   • Abnormal breathing 01/24/2019   • Mixed hyperlipidemia 01/24/2019   • Thoracic aortic aneurysm without rupture (CMS/HCC) (HCC) 11/29/2018   • Essential hypertension 11/29/2018       Allergy    Losartan potassium          MED LIST       Current Outpatient Prescriptions   Medication Sig Dispense Refill   • amLODIPine (NORVASC) 5 mg tablet Take 1 tablet (5 mg total) by mouth 2 (two) times a day. 90 tablet 3   • cetirizine (ZyrTEC) 10 mg tablet Take 10 mg by mouth as needed.     • metoprolol succinate XL (TOPROL-XL) 50 mg 24 hr tablet Take 1 tablet (50 mg total) by mouth daily. 90 tablet 6   • metroNIDAZOLE (METROGEL) 1 % gel Apply topically nightly.  2   • mometasone (NASONEX) 50 mcg/actuation nasal spray Administer 1 spray into affected nostril(s) daily as needed.     • PROAIR RESPICLICK 90 mcg/actuation inhaler Inhale 2 puffs every 4 (four) hours as needed.  3   • PULMICORT FLEXHALER 90 mcg/actuation inhaler Inhale 1 puff daily as needed.  5     No current facility-administered medications for this visit.                 Review of Systems   Constitution: Negative for malaise/fatigue, weight gain and weight loss.   HENT: Negative for hearing loss  and hoarse voice.    Eyes: Negative for visual disturbance.   Cardiovascular: Negative for chest pain, claudication, cyanosis, dyspnea on exertion, irregular heartbeat, leg swelling, near-syncope, orthopnea, palpitations, paroxysmal nocturnal dyspnea and syncope.   Respiratory: Negative for cough, hemoptysis, shortness of breath, sleep disturbances due to breathing, snoring, sputum production and wheezing.         Abnormal breathing at night according to his wife   Endocrine: Negative for cold intolerance and heat intolerance.   Hematologic/Lymphatic: Negative.  Negative for bleeding problem. Does not bruise/bleed easily.   Skin: Negative.  Negative for rash.   Musculoskeletal: Negative for arthritis, falls, joint pain, muscle cramps and myalgias.   Gastrointestinal: Negative for abdominal pain, anorexia, change in bowel habit, constipation, diarrhea, dysphagia, heartburn, jaundice and nausea.   Genitourinary: Negative for frequency and nocturia.   Neurological: Negative for dizziness, focal weakness, headaches, light-headedness, numbness, tremors and vertigo.   Psychiatric/Behavioral: Negative for memory loss. The patient does not have insomnia and is not nervous/anxious.    Allergic/Immunologic: Negative for hives.       Labs   Lab Results   Component Value Date    WBC 3.7 (L) 07/31/2019    HGB 14.4 07/31/2019    HCT 41.5 07/31/2019     07/31/2019    CHOL 165 07/31/2019    TRIG 81 07/31/2019    HDL 51 07/31/2019    LDLCALC 97 07/31/2019    ALT 10 07/31/2019    AST 16 07/31/2019     07/31/2019    K 4.2 07/31/2019     07/31/2019    CREATININE 0.97 07/31/2019    BUN 10 07/31/2019    CO2 25 07/31/2019    TSH 3.98 07/31/2019    HGBA1C 5.0 02/29/2016       Lab Results   Component Value Date    GLUCOSE 94 07/31/2019    CALCIUM 9.2 07/31/2019     07/31/2019    K 4.2 07/31/2019    CO2 25 07/31/2019     07/31/2019    BUN 10 07/31/2019    CREATININE 0.97 07/31/2019         Objective   Vitals:     08/05/19 0824   BP: 116/72   Pulse: (!) 51   SpO2: 99%   Weight: 87.1 kg (192 lb)   Height: 1.829 m (6')     Physical Exam   Constitutional: He is oriented to person, place, and time. He appears well-developed and well-nourished. He is active.  Non-toxic appearance. He does not have a sickly appearance. He does not appear ill. No distress. He is not intubated.   HENT:   Head: Normocephalic. Not microcephalic. Head is without raccoon's eyes, without abrasion and without contusion.   Nose: Nose normal.   Eyes: EOM are normal. Left eye exhibits no discharge and no exudate. Right conjunctiva is not injected. Left conjunctiva is not injected. Left conjunctiva has no hemorrhage. No scleral icterus. Pupils are equal.   Neck: Normal range of motion. Neck supple. Normal carotid pulses and no hepatojugular reflux present. Carotid bruit is not present.   Cardiovascular: Normal rate, regular rhythm, normal heart sounds, intact distal pulses and normal pulses.  PMI is not displaced.  Exam reveals no gallop and no friction rub.    No murmur heard.  Pulmonary/Chest: Effort normal and breath sounds normal. No stridor. No apnea, no tachypnea and no bradypnea. He is not intubated. No respiratory distress. He has no wheezes. He has no rales. He exhibits no tenderness.   Abdominal: Soft. Normal appearance, normal aorta and bowel sounds are normal. He exhibits no distension. There is no tenderness.   Musculoskeletal: Normal range of motion. He exhibits no edema or deformity.   Neurological: He is alert and oriented to person, place, and time.   Skin: No abrasion, no bruising, no ecchymosis and no rash noted. He is not diaphoretic. No erythema. No pallor.   Psychiatric: He has a normal mood and affect. His mood appears not anxious. His affect is not angry, not blunt, not labile and not inappropriate. His speech is not slurred. He is not agitated, not aggressive, not withdrawn and not combative. He does not exhibit a depressed mood. He  is communicative.         Cardiac Procedures  Procedures     Cardiovascular Procedures:  ECHO/MUGA:   Echo (Normal EF, ao root 3.9  redundant atrial septum) - 5/20/2016    Echo (aortic root 4.3 nl ef) - 12/18 no change        VASCULAR:    CT/MRI:  Cardiac Calcium Score (Total 0:, aortic root 4.0) - 5/12/2015     Chest CT (thoracic aorta 4.3 liver cyst) - 11/2/2015     Chest CT (aortic root 4.4 ( was 4.3 11.15)no change feb 2015, 2mm charley nodule no change 2015) - 12/19/2016     Chest CT (cta 4.2 michael repeat 2 years) - aortic root index 2.0 5/2017    cta 9/19 aorta 4.1 stable  :      EKG      Assessment/Plan:      Thoracic aortic aneurysm without rupture (CMS/HCC) (HCC)  Last CT angiogram 4.4 May 2017 follows with Dr. Antonio  Due for repeat imaging now gave him a slip for CTA and [facility]  He has no known obstructive CAD with coronary calcium score of 0 in 2015  Last echocardiogram aortic root 4.3 normal LV systolic function normal aortic valve this was done December 2018, rest echo with next visit    Mixed hyperlipidemia  He has no known obstructive CAD baseline LDL cholesterol 90 on no statin therapy    Essential hypertension  Keeping systolic blood pressure under 130 with thoracic aneurysm he is on amlodipine and beta-blocker gets intermittent hand swelling probably from a load cream the amlodipine, he said it is tolerable no changes needed     I asked him to get CTA of thoracic aortic pain and follow-up with Dr. Antonio  I made no changes in medications follow-up in 6 months with echocardiogram        Thank you for allowing me to participate in the care of this patient.  I hope this information is helpful.    Hubert Schrader MD [facility]   8/5/2019   Kiah Hamilton MD

## 2019-08-01 LAB
ALBUMIN SERPL-MCNC: 4.2 G/DL (ref 3.6–5.1)
ALBUMIN/GLOB SERPL: 1.6 (CALC) (ref 1–2.5)
ALP SERPL-CCNC: 47 U/L (ref 40–115)
ALT SERPL-CCNC: 10 U/L (ref 9–46)
AST SERPL-CCNC: 16 U/L (ref 10–35)
BASOPHILS # BLD AUTO: 19 CELLS/UL (ref 0–200)
BASOPHILS NFR BLD AUTO: 0.5 %
BILIRUB SERPL-MCNC: 0.5 MG/DL (ref 0.2–1.2)
BUN SERPL-MCNC: 10 MG/DL (ref 7–25)
BUN/CREAT SERPL: NORMAL (CALC) (ref 6–22)
CALCIUM SERPL-MCNC: 9.2 MG/DL (ref 8.6–10.3)
CHLORIDE SERPL-SCNC: 106 MMOL/L (ref 98–110)
CHOLEST SERPL-MCNC: 165 MG/DL
CHOLEST/HDLC SERPL: 3.2 (CALC)
CO2 SERPL-SCNC: 25 MMOL/L (ref 20–32)
CREAT SERPL-MCNC: 0.97 MG/DL (ref 0.7–1.33)
EOSINOPHIL # BLD AUTO: 189 CELLS/UL (ref 15–500)
EOSINOPHIL NFR BLD AUTO: 5.1 %
ERYTHROCYTE [DISTWIDTH] IN BLOOD BY AUTOMATED COUNT: 12.7 % (ref 11–15)
GLOBULIN SER CALC-MCNC: 2.7 G/DL (CALC) (ref 1.9–3.7)
GLUCOSE SERPL-MCNC: 94 MG/DL (ref 65–99)
HCT VFR BLD AUTO: 41.5 % (ref 38.5–50)
HDLC SERPL-MCNC: 51 MG/DL
HGB BLD-MCNC: 14.4 G/DL (ref 13.2–17.1)
LDLC SERPL CALC-MCNC: 97 MG/DL (CALC)
LYMPHOCYTES # BLD AUTO: 725 CELLS/UL (ref 850–3900)
LYMPHOCYTES NFR BLD AUTO: 19.6 %
MCH RBC QN AUTO: 31.7 PG (ref 27–33)
MCHC RBC AUTO-ENTMCNC: 34.7 G/DL (ref 32–36)
MCV RBC AUTO: 91.4 FL (ref 80–100)
MONOCYTES # BLD AUTO: 525 CELLS/UL (ref 200–950)
MONOCYTES NFR BLD AUTO: 14.2 %
NEUTROPHILS # BLD AUTO: 2242 CELLS/UL (ref 1500–7800)
NEUTROPHILS NFR BLD AUTO: 60.6 %
NONHDLC SERPL-MCNC: 114 MG/DL (CALC)
PLATELET # BLD AUTO: 174 THOUSAND/UL (ref 140–400)
PMV BLD REES-ECKER: 11.9 FL (ref 7.5–12.5)
POTASSIUM SERPL-SCNC: 4.2 MMOL/L (ref 3.5–5.3)
PROT SERPL-MCNC: 6.9 G/DL (ref 6.1–8.1)
QUEST EGFR NON-AFR. AMERICAN: 88 ML/MIN/1.73M2
RBC # BLD AUTO: 4.54 MILLION/UL (ref 4.2–5.8)
SODIUM SERPL-SCNC: 138 MMOL/L (ref 135–146)
TRIGL SERPL-MCNC: 81 MG/DL
TSH SERPL-ACNC: 3.98 MIU/L (ref 0.4–4.5)
WBC # BLD AUTO: 3.7 THOUSAND/UL (ref 3.8–10.8)

## 2019-08-05 ENCOUNTER — OFFICE VISIT (OUTPATIENT)
Dept: CARDIOLOGY | Facility: CLINIC | Age: 54
End: 2019-08-05
Payer: COMMERCIAL

## 2019-08-05 ENCOUNTER — TELEPHONE (OUTPATIENT)
Dept: CARDIOLOGY | Facility: CLINIC | Age: 54
End: 2019-08-05

## 2019-08-05 VITALS
OXYGEN SATURATION: 99 % | BODY MASS INDEX: 26.01 KG/M2 | HEART RATE: 51 BPM | DIASTOLIC BLOOD PRESSURE: 72 MMHG | WEIGHT: 192 LBS | HEIGHT: 72 IN | SYSTOLIC BLOOD PRESSURE: 116 MMHG

## 2019-08-05 DIAGNOSIS — I10 ESSENTIAL HYPERTENSION: ICD-10-CM

## 2019-08-05 DIAGNOSIS — E78.2 MIXED HYPERLIPIDEMIA: Primary | ICD-10-CM

## 2019-08-05 DIAGNOSIS — I71.20 THORACIC AORTIC ANEURYSM WITHOUT RUPTURE (CMS/HCC): ICD-10-CM

## 2019-08-05 PROCEDURE — 93000 ELECTROCARDIOGRAM COMPLETE: CPT | Performed by: INTERNAL MEDICINE

## 2019-08-05 PROCEDURE — 99214 OFFICE O/P EST MOD 30 MIN: CPT | Performed by: INTERNAL MEDICINE

## 2019-08-05 RX ORDER — METOPROLOL SUCCINATE 50 MG/1
50 TABLET, EXTENDED RELEASE ORAL DAILY
Qty: 90 TABLET | Refills: 6 | Status: SHIPPED | OUTPATIENT
Start: 2019-08-05 | End: 2020-08-11

## 2019-08-05 RX ORDER — AMLODIPINE BESYLATE 5 MG/1
5 TABLET ORAL 2 TIMES DAILY
Qty: 90 TABLET | Refills: 3 | Status: SHIPPED | OUTPATIENT
Start: 2019-08-05 | End: 2020-08-10

## 2019-08-05 NOTE — ASSESSMENT & PLAN NOTE
Keeping systolic blood pressure under 130 with thoracic aneurysm he is on amlodipine and beta-blocker gets intermittent hand swelling probably from a load cream the amlodipine, he said it is tolerable no changes needed

## 2019-08-05 NOTE — ASSESSMENT & PLAN NOTE
Last CT angiogram 4.4 May 2017 follows with Dr. Antonio  Due for repeat imaging now gave him a slip for CTA and follow-up at [facility]  He has no known obstructive CAD with coronary calcium score of 0 in 2015  Last echocardiogram aortic root 4.3 normal LV systolic function normal aortic valve this was done December 2018, rest echo with next visit

## 2019-08-05 NOTE — LETTER
August 5, 2019     Kiah Messina MD  [address] [facility] [address]    Patient: Jesus Oconnor  YOB: 1965  Date of Visit: 8/5/2019      Dear Delmis    Thank you for referring Jesus Oconnor to me for evaluation. Below are my notes for this consultation.    If you have questions, please do not hesitate to call me. I look forward to following your patient along with you.         Sincerely,        Hubert Schrader MD        CC: MD Raciel Hansen, Hubert BOSCH MD  8/5/2019  9:13 AM  Sign at close encounter      Cardiology Note    Kiah Hamilton MD          Jesus Oconnor is a 53 y.o. male 1965     He returns for follow-up of his thoracic aneurysm  He had CT angiogram in May 2017 aortic root 4.4 he follows with Dr. Antonio  He was told to follow-up in 2 years, which is now    Last echocardiogram December 2018 preserved LV systolic function normal aortic valve aortic root 4.3 stable  He has no known obstructive CAD with coronary calcium score of 0 in 2015  He has minimally elevated cholesterol with LDL of 100  And hypertension  He complains of mild edema in his hands sometimes after walking that resolves quickly it is tolerable, probably related to amlodipine I made no changes today         ease alpful.    Patient Active Problem List    Diagnosis Date Noted   • Abnormal breathing 01/24/2019   • Mixed hyperlipidemia 01/24/2019   • Thoracic aortic aneurysm without rupture (CMS/HCC) ([facility]) 11/29/2018   • Essential hypertension 11/29/2018       Allergy    Losartan potassium          MED LIST       Current Outpatient Prescriptions   Medication Sig Dispense Refill   • amLODIPine (NORVASC) 5 mg tablet Take 1 tablet (5 mg total) by mouth 2 (two) times a day. 90 tablet 3   • cetirizine (ZyrTEC) 10 mg tablet Take 10 mg by mouth as needed.     • metoprolol succinate XL (TOPROL-XL) 50 mg 24 hr tablet Take 1 tablet (50 mg total) by mouth daily. 90 tablet 6   •  metroNIDAZOLE (METROGEL) 1 % gel Apply topically nightly.  2   • mometasone (NASONEX) 50 mcg/actuation nasal spray Administer 1 spray into affected nostril(s) daily as needed.     • PROAIR RESPICLICK 90 mcg/actuation inhaler Inhale 2 puffs every 4 (four) hours as needed.  3   • PULMICORT FLEXHALER 90 mcg/actuation inhaler Inhale 1 puff daily as needed.  5     No current facility-administered medications for this visit.                 Review of Systems   Constitution: Negative for malaise/fatigue, weight gain and weight loss.   HENT: Negative for hearing loss and hoarse voice.    Eyes: Negative for visual disturbance.   Cardiovascular: Negative for chest pain, claudication, cyanosis, dyspnea on exertion, irregular heartbeat, leg swelling, near-syncope, orthopnea, palpitations, paroxysmal nocturnal dyspnea and syncope.   Respiratory: Negative for cough, hemoptysis, shortness of breath, sleep disturbances due to breathing, snoring, sputum production and wheezing.         Abnormal breathing at night according to his wife   Endocrine: Negative for cold intolerance and heat intolerance.   Hematologic/Lymphatic: Negative.  Negative for bleeding problem. Does not bruise/bleed easily.   Skin: Negative.  Negative for rash.   Musculoskeletal: Negative for arthritis, falls, joint pain, muscle cramps and myalgias.   Gastrointestinal: Negative for abdominal pain, anorexia, change in bowel habit, constipation, diarrhea, dysphagia, heartburn, jaundice and nausea.   Genitourinary: Negative for frequency and nocturia.   Neurological: Negative for dizziness, focal weakness, headaches, light-headedness, numbness, tremors and vertigo.   Psychiatric/Behavioral: Negative for memory loss. The patient does not have insomnia and is not nervous/anxious.    Allergic/Immunologic: Negative for hives.       Labs   Lab Results   Component Value Date    WBC 3.7 (L) 07/31/2019    HGB 14.4 07/31/2019    HCT 41.5 07/31/2019     07/31/2019     CHOL 165 07/31/2019    TRIG 81 07/31/2019    HDL 51 07/31/2019    LDLCALC 97 07/31/2019    ALT 10 07/31/2019    AST 16 07/31/2019     07/31/2019    K 4.2 07/31/2019     07/31/2019    CREATININE 0.97 07/31/2019    BUN 10 07/31/2019    CO2 25 07/31/2019    TSH 3.98 07/31/2019    HGBA1C 5.0 02/29/2016       Lab Results   Component Value Date    GLUCOSE 94 07/31/2019    CALCIUM 9.2 07/31/2019     07/31/2019    K 4.2 07/31/2019    CO2 25 07/31/2019     07/31/2019    BUN 10 07/31/2019    CREATININE 0.97 07/31/2019         Objective   Vitals:    08/05/19 0824   BP: 116/72   Pulse: (!) 51   SpO2: 99%   Weight: 87.1 kg (192 lb)   Height: 1.829 m (6')     Physical Exam   Constitutional: He is oriented to person, place, and time. He appears well-developed and well-nourished. He is active.  Non-toxic appearance. He does not have a sickly appearance. He does not appear ill. No distress. He is not intubated.   HENT:   Head: Normocephalic. Not microcephalic. Head is without raccoon's eyes, without abrasion and without contusion.   Nose: Nose normal.   Eyes: EOM are normal. Left eye exhibits no discharge and no exudate. Right conjunctiva is not injected. Left conjunctiva is not injected. Left conjunctiva has no hemorrhage. No scleral icterus. Pupils are equal.   Neck: Normal range of motion. Neck supple. Normal carotid pulses and no hepatojugular reflux present. Carotid bruit is not present.   Cardiovascular: Normal rate, regular rhythm, normal heart sounds, intact distal pulses and normal pulses.  PMI is not displaced.  Exam reveals no gallop and no friction rub.    No murmur heard.  Pulmonary/Chest: Effort normal and breath sounds normal. No stridor. No apnea, no tachypnea and no bradypnea. He is not intubated. No respiratory distress. He has no wheezes. He has no rales. He exhibits no tenderness.   Abdominal: Soft. Normal appearance, normal aorta and bowel sounds are normal. He exhibits no distension.  There is no tenderness.   Musculoskeletal: Normal range of motion. He exhibits no edema or deformity.   Neurological: He is alert and oriented to person, place, and time.   Skin: No abrasion, no bruising, no ecchymosis and no rash noted. He is not diaphoretic. No erythema. No pallor.   Psychiatric: He has a normal mood and affect. His mood appears not anxious. His affect is not angry, not blunt, not labile and not inappropriate. His speech is not slurred. He is not agitated, not aggressive, not withdrawn and not combative. He does not exhibit a depressed mood. He is communicative.         Cardiac Procedures  Procedures     Cardiovascular Procedures:  ECHO/MUGA:   Echo (Normal EF, ao root 3.9  redundant atrial septum) - 5/20/2016    Echo (aortic root 4.3 nl ef) - 12/18 no change        VASCULAR:    CT/MRI:  Cardiac Calcium Score (Total 0:, aortic root 4.0) - 5/12/2015     Chest CT (thoracic aorta 4.3 liver cyst) - 11/2/2015     Chest CT (aortic root 4.4 ( was 4.3 11.15)no change feb 2015, 2mm charley nodule no change 2015) - 12/19/2016     Chest CT (cta 4.2 michael repeat 2 years) - aortic root index 2.0 5/2017  :      EKG      Assessment/Plan:      Thoracic aortic aneurysm without rupture (CMS/HCC) (HCC)  Last CT angiogram 4.4 May 2017 follows with Dr. Michael Mckeon for repeat imaging now gave him a slip for CTA and follow-up at [facility]  He has no known obstructive CAD with coronary calcium score of 0 in 2015  Last echocardiogram aortic root 4.3 normal LV systolic function normal aortic valve this was done December 2018, rest echo with next visit    Mixed hyperlipidemia  He has no known obstructive CAD baseline LDL cholesterol 90 on no statin therapy    Essential hypertension  Keeping systolic blood pressure under 130 with thoracic aneurysm he is on amlodipine and beta-blocker gets intermittent hand swelling probably from a load cream the amlodipine, he said it is tolerable no changes needed     I asked  him to get CTA of thoracic aortic pain and follow-up with Dr. Antonio  I made no changes in medications follow-up in 6 months with echocardiogram        Thank you for allowing me to participate in the care of this patient.  I hope this information is helpful.    Hubert Schrader MD [facility]   8/5/2019   Kiah Hamilton MD

## 2019-08-30 ENCOUNTER — TELEPHONE (OUTPATIENT)
Dept: SCHEDULING | Facility: CLINIC | Age: 54
End: 2019-08-30

## 2019-08-30 NOTE — TELEPHONE ENCOUNTER
Pt of Dr. Schrader- Having CT angiogram of chest w and w/o contrast at [facility] [place]. Pt is requesting precert number    He can be reached at [phone]

## 2019-08-30 NOTE — TELEPHONE ENCOUNTER
Called pt to advise that we need NPI to precert for testing. Pt will call back with address, and NPI of location.

## 2019-10-01 ENCOUNTER — TELEPHONE (OUTPATIENT)
Dept: CARDIOLOGY | Facility: CLINIC | Age: 54
End: 2019-10-01

## 2019-10-01 NOTE — TELEPHONE ENCOUNTER
Spoke to pt, his CTA of thoracic aorta is stable. Pt understands and has no further questions at this time.

## 2019-10-24 ENCOUNTER — HOSPITAL ENCOUNTER (OUTPATIENT)
Dept: CARDIOLOGY | Facility: CLINIC | Age: 54
Discharge: HOME | End: 2019-10-24
Attending: INTERNAL MEDICINE
Payer: COMMERCIAL

## 2019-10-24 VITALS
SYSTOLIC BLOOD PRESSURE: 116 MMHG | WEIGHT: 192 LBS | HEIGHT: 72 IN | BODY MASS INDEX: 26.01 KG/M2 | DIASTOLIC BLOOD PRESSURE: 72 MMHG

## 2019-10-24 DIAGNOSIS — I71.20 THORACIC AORTIC ANEURYSM WITHOUT RUPTURE (CMS/HCC): ICD-10-CM

## 2019-10-24 LAB
AORTIC ROOT ANNULUS: 4.2 CM
ASCENDING AORTA: 4.1 CM
AV PEAK GRADIENT: 4 MMHG
AV PEAK VELOCITY-S: 1.03 M/S
AV VALVE AREA: 3.49 CM2
BSA FOR ECHO PROCEDURE: 2.1 M2
CUSP SEPARATION: 2.9 CM
E WAVE DECELERATION TIME: 373 MS
E/A RATIO: 1.3
E/E' RATIO: 9.2
E/LAT E' RATIO: 5.8
EDV (BP): 121 CM3
EF (A4C): 60.7 %
EF A2C: 63.5 %
EJECTION FRACTION: 61.7 %
EST RIGHT VENT SYSTOLIC PRESSURE BY TRICUSPID REGURGITATION JET: 25 MMHG
ESV (BP): 46.4 CM3
FRACTIONAL SHORTENING: 37.1 %
INTERVENTRICULAR SEPTUM: 1.03 CM
LA ESV (BP): 75.2 CM3
LA ESV INDEX (A2C): 36 CM3/M2
LA ESV INDEX (BP): 35.81 CM3/M2
LA/AORTA RATIO: 0.95
LAAS-AP2: 24.3 CM2
LAAS-AP4: 22.1 CM2
LAD 2D: 4 CM
LALD A4C: 5.44 CM
LALD A4C: 6.31 CM
LAV-S: 75.6 CM3
LEFT ATRIUM VOLUME INDEX: 31.71 CM3/M2
LEFT ATRIUM VOLUME: 66.6 CM3
LEFT INTERNAL DIMENSION IN SYSTOLE: 3.09 CM (ref 3.22–4.87)
LEFT VENTRICLE DIASTOLIC VOLUME INDEX: 51.43 CM3/M2
LEFT VENTRICLE DIASTOLIC VOLUME: 108 CM3
LEFT VENTRICLE SYSTOLIC VOLUME INDEX: 20.19 CM3/M2
LEFT VENTRICLE SYSTOLIC VOLUME: 42.4 CM3
LEFT VENTRICULAR INTERNAL DIMENSION IN DIASTOLE: 4.91 CM (ref 5.5–7.63)
LEFT VENTRICULAR POSTERIOR WALL IN END DIASTOLE: 1.05 CM (ref 0.7–1.3)
LV DIASTOLIC VOLUME: 130 CM3
LV ESV (APICAL 2 CHAMBER): 47.4 CM3
LVAD-AP2: 37.1 CM2
LVAD-AP4: 33.5 CM2
LVAS-AP2: 19.5 CM2
LVAS-AP4: 17.9 CM2
LVEDVI(A2C): 61.9 CM3/M2
LVEDVI(BP): 57.62 CM3/M2
LVESVI(A2C): 22.57 CM3/M2
LVESVI(BP): 22.1 CM3/M2
LVLD-AP2: 9.13 CM
LVLD-AP4: 8.58 CM
LVLS-AP2: 7.38 CM
LVLS-AP4: 6.69 CM
LVOT 2D: 2.3 CM
LVOT A: 4.15 CM2
LVOT PEAK VELOCITY: 0.87 M/S
MV E'TISSUE VEL-LAT: 0.12 M/S
MV E'TISSUE VEL-MED: 0.07 M/S
MV PEAK A VEL: 0.52 M/S
MV PEAK E VEL: 0.67 M/S
POSTERIOR WALL: 1.05 CM
PV PEAK GRADIENT: 4 MMHG
PV PV: 1.04 M/S
RAP: 5 MMHG
RVOT VMAX: 0.57 M/S
SEPTAL TISSUE DOPPLER FREE WALL LATE DIA VELOCITY (APICAL 4 CHAMBER VIEW): 0.11 M/S
TR MAX PG: 20 MMHG
TRICUSPID VALVE PEAK REGURGITATION VELOCITY: 2.21 M/S
Z-SCORE OF LEFT VENTRICULAR DIMENSION IN END DIASTOLE: -2.77
Z-SCORE OF LEFT VENTRICULAR DIMENSION IN END SYSTOLE: -1.96
Z-SCORE OF LEFT VENTRICULAR POSTERIOR WALL IN END DIASTOLE: 0.51

## 2019-10-24 PROCEDURE — 93306 TTE W/DOPPLER COMPLETE: CPT | Performed by: INTERNAL MEDICINE

## 2020-02-04 ASSESSMENT — ENCOUNTER SYMPTOMS
CONSTIPATION: 0
COUGH: 0
CHANGE IN BOWEL HABIT: 0
HEMATOLOGIC/LYMPHATIC NEGATIVE: 1
INSOMNIA: 0
SPUTUM PRODUCTION: 0
HEADACHES: 0
NAUSEA: 0
DYSPNEA ON EXERTION: 0
ANOREXIA: 0
ORTHOPNEA: 0
HOARSE VOICE: 0
NERVOUS/ANXIOUS: 0
CLAUDICATION: 0
ABDOMINAL PAIN: 0
NEAR-SYNCOPE: 0
BRUISES/BLEEDS EASILY: 0
HEARTBURN: 0
FREQUENCY: 0
WEIGHT GAIN: 0
SYNCOPE: 0
SNORING: 0
NUMBNESS: 0
DIZZINESS: 0
SHORTNESS OF BREATH: 0
MYALGIAS: 0
WEIGHT LOSS: 0
MEMORY LOSS: 0
TREMORS: 0
MUSCLE CRAMPS: 0
IRREGULAR HEARTBEAT: 0
PALPITATIONS: 0
LIGHT-HEADEDNESS: 0
FOCAL WEAKNESS: 0
JAUNDICE: 0
SLEEP DISTURBANCES DUE TO BREATHING: 0
WHEEZING: 0
VERTIGO: 0
PND: 0
FALLS: 0
HEMOPTYSIS: 0
DIARRHEA: 0

## 2020-02-04 NOTE — PROGRESS NOTES
Cardiology Note    Kiah Hamilton MD          Jesus Oconnor is a 54 y.o. male 1965     He returns for follow-up he has history of thoracic aneurysm follows at [facility] with Dr. Antonio  Last seen November 2019 recommended repeat imaging and follow-up in 2 years  Aortic root varies from 4.1-4.4 on the measurements    Echocardiogram October 2019 stable aortic root four-point 2 aortic root index 2 cm/m² normal aortic valve  He has a history for hypertension mixed hyperlipidemia  No known coronary disease with a calcium score of 0 in 2015  His main medical complaint is tinnitus          Lab work February 2020 cholesterol 185 HDL 56   Creatinine 0.86 potassium 4.3  CBC normal                      dipine I made no changes today         ease alpful.    Patient Active Problem List    Diagnosis Date Noted   • Abnormal breathing 01/24/2019   • Mixed hyperlipidemia 01/24/2019   • Thoracic aortic aneurysm without rupture (CMS/HCC) 11/29/2018   • Essential hypertension 11/29/2018       Allergy    Losartan potassium          MED LIST       Current Outpatient Medications   Medication Sig Dispense Refill   • amLODIPine (NORVASC) 5 mg tablet Take 1 tablet (5 mg total) by mouth 2 (two) times a day. 90 tablet 3   • beclomethasone dipropionate (QVAR INHL) Inhale as needed.     • cetirizine (ZyrTEC) 10 mg tablet Take 10 mg by mouth as needed.     • metoprolol succinate XL (TOPROL-XL) 50 mg 24 hr tablet Take 1 tablet (50 mg total) by mouth daily. 90 tablet 6   • metroNIDAZOLE (METROGEL) 1 % gel Apply topically nightly.  2   • mometasone (NASONEX) 50 mcg/actuation nasal spray Administer 1 spray into affected nostril(s) daily as needed.     • PROAIR RESPICLICK 90 mcg/actuation inhaler Inhale 2 puffs every 4 (four) hours as needed.  3   • PULMICORT FLEXHALER 90 mcg/actuation inhaler Inhale 1 puff daily as needed.  5     No current facility-administered medications for this visit.                  Review of Systems   Constitution: Negative for malaise/fatigue, weight gain and weight loss.   HENT: Negative for hearing loss and hoarse voice.    Eyes: Negative for visual disturbance.   Cardiovascular: Negative for chest pain, claudication, cyanosis, dyspnea on exertion, irregular heartbeat, leg swelling, near-syncope, orthopnea, palpitations, paroxysmal nocturnal dyspnea and syncope.   Respiratory: Negative for cough, hemoptysis, shortness of breath, sleep disturbances due to breathing, snoring, sputum production and wheezing.         Abnormal breathing at night according to his wife   Endocrine: Negative for cold intolerance and heat intolerance.   Hematologic/Lymphatic: Negative.  Negative for bleeding problem. Does not bruise/bleed easily.   Skin: Negative.  Negative for rash.   Musculoskeletal: Negative for arthritis, falls, joint pain, muscle cramps and myalgias.   Gastrointestinal: Negative for abdominal pain, anorexia, change in bowel habit, constipation, diarrhea, dysphagia, heartburn, jaundice and nausea.   Genitourinary: Negative for frequency and nocturia.   Neurological: Negative for dizziness, focal weakness, headaches, light-headedness, numbness, tremors and vertigo.   Psychiatric/Behavioral: Negative for memory loss. The patient does not have insomnia and is not nervous/anxious.    Allergic/Immunologic: Negative for hives.       Labs   Lab Results   Component Value Date    WBC 5.6 02/06/2020    HGB 14.8 02/06/2020    HCT 43.5 02/06/2020     02/06/2020    CHOL 185 02/06/2020    TRIG 89 02/06/2020    HDL 56 02/06/2020    LDLCALC 111 (H) 02/06/2020    ALT 14 02/06/2020    AST 17 02/06/2020     02/06/2020    K 4.3 02/06/2020     02/06/2020    CREATININE 0.86 02/06/2020    BUN 11 02/06/2020    CO2 27 02/06/2020    TSH 3.17 02/06/2020    HGBA1C 5.0 02/29/2016       Lab Results   Component Value Date    GLUCOSE 104 02/06/2020    CALCIUM 9.2 02/06/2020     02/06/2020     K 4.3 02/06/2020    CO2 27 02/06/2020     02/06/2020    BUN 11 02/06/2020    CREATININE 0.86 02/06/2020         Objective   Vitals:    02/10/20 0815   BP: 110/80   Pulse: (!) 57   SpO2: 99%   Weight: 88.9 kg (196 lb)   Height: 1.829 m (6')     Physical Exam   Constitutional: He is oriented to person, place, and time. He appears well-developed and well-nourished. He is active.  Non-toxic appearance. He does not have a sickly appearance. He does not appear ill. No distress. He is not intubated.   HENT:   Head: Normocephalic. Not microcephalic. Head is without raccoon's eyes, without abrasion and without contusion.   Nose: Nose normal.   Eyes: EOM are normal. Left eye exhibits no discharge and no exudate. Right conjunctiva is not injected. Left conjunctiva is not injected. Left conjunctiva has no hemorrhage. No scleral icterus. Pupils are equal.   Neck: Normal range of motion. Neck supple. Normal carotid pulses and no hepatojugular reflux present. Carotid bruit is not present.   Cardiovascular: Normal rate, regular rhythm, normal heart sounds, intact distal pulses and normal pulses. PMI is not displaced. Exam reveals no gallop and no friction rub.   No murmur heard.  Pulmonary/Chest: Effort normal and breath sounds normal. No stridor. No apnea, no tachypnea and no bradypnea. He is not intubated. No respiratory distress. He has no wheezes. He has no rales. He exhibits no tenderness.   Abdominal: Soft. Normal appearance, normal aorta and bowel sounds are normal. He exhibits no distension. There is no tenderness.   Musculoskeletal: Normal range of motion. He exhibits no edema or deformity.   Neurological: He is alert and oriented to person, place, and time.   Skin: No abrasion, no bruising, no ecchymosis and no rash noted. He is not diaphoretic. No erythema. No pallor.   Psychiatric: He has a normal mood and affect. His mood appears not anxious. His affect is not angry, not blunt, not labile and not  inappropriate. His speech is not slurred. He is not agitated, not aggressive, not withdrawn and not combative. He does not exhibit a depressed mood. He is communicative.         Cardiac Procedures  Procedures     Cardiovascular Procedures:  ECHO/MUGA:   Echo (Normal EF, ao root 3.9  redundant atrial septum) - 5/20/2016    Echo (aortic root 4.3 nl ef) - oct 2019  aorta 4.2 ar index 2  Nl ev av normal  no change        VASCULAR:    CT/MRI:  Cardiac Calcium Score (Total 0:, aortic root 4.0) - 5/12/2015     Chest CT (thoracic aorta 4.3 liver cyst) - 11/2/2015     Chest CT (aortic root 4.4 ( was 4.3 11.15)no change feb 2015, 2mm charley nodule no change 2015) - 12/19/2016     Chest CT (cta 4.2 michael repeat 2 years) - aortic root index 2.0 5/2017    cta 9/19 aorta 4.1 stable    Cor maribel score  Oct 2020  zero aorta 4.5 stable oct 2020  :      EKG      Assessment/Plan:      Thoracic aortic aneurysm without rupture (CMS/HCC)  CTA of chest September 2019 4.1 stable  Due for follow-up with Dr. Antonio in the fall 2021 no known obstructive CAD with coronary calcium score of 0 in 2015 repeat calcium score prior to next visit last echocardiogram October 2019 aortic root four-point 2 aortic root index of 2 mildly elevated normal LV systolic function follow-up echo next visit    Mixed hyperlipidemia  LDL cholesterol crept up a little bit of 111 again he has no known CAD with coronary calcium score of 0 back in 2015 try natural therapy with plant stanols and diet if not improved in 6 months to consider statin    Essential hypertension  Controlled on combination amlodipine and beta-blocker goal is systolic under 130 with dilated aortic root     I  Still crept up a little bit he has no known obstructive CAD with coronary calciumscore of 0 in 2015  Put on natural therapy with plant stanols  He is very try to lose some weight  Follow-up in 6 months with coronary calcium score  And rest echo at visit  Due for follow-up CTA and follow-up  with Dr. Antonio September 2021        Thank you for allowing me to participate in the care of this patient.  I hope this information is helpful.    Hubert Schrader MD [facility]   2/10/2020  Cc Kiah Hamilton MD

## 2020-02-08 LAB
ALBUMIN SERPL-MCNC: 4.4 G/DL (ref 3.6–5.1)
ALBUMIN/GLOB SERPL: 1.5 (CALC) (ref 1–2.5)
ALP SERPL-CCNC: 46 U/L (ref 35–144)
ALT SERPL-CCNC: 14 U/L (ref 9–46)
AST SERPL-CCNC: 17 U/L (ref 10–35)
BASOPHILS # BLD AUTO: 50 CELLS/UL (ref 0–200)
BASOPHILS NFR BLD AUTO: 0.9 %
BILIRUB SERPL-MCNC: 0.7 MG/DL (ref 0.2–1.2)
BUN SERPL-MCNC: 11 MG/DL (ref 7–25)
BUN/CREAT SERPL: NORMAL (CALC) (ref 6–22)
CALCIUM SERPL-MCNC: 9.2 MG/DL (ref 8.6–10.3)
CHLORIDE SERPL-SCNC: 102 MMOL/L (ref 98–110)
CHOLEST SERPL-MCNC: 185 MG/DL
CHOLEST/HDLC SERPL: 3.3 (CALC)
CO2 SERPL-SCNC: 27 MMOL/L (ref 20–32)
CREAT SERPL-MCNC: 0.86 MG/DL (ref 0.7–1.33)
EOSINOPHIL # BLD AUTO: 174 CELLS/UL (ref 15–500)
EOSINOPHIL NFR BLD AUTO: 3.1 %
ERYTHROCYTE [DISTWIDTH] IN BLOOD BY AUTOMATED COUNT: 12.7 % (ref 11–15)
GLOBULIN SER CALC-MCNC: 2.9 G/DL (CALC) (ref 1.9–3.7)
GLUCOSE SERPL-MCNC: 104 MG/DL (ref 65–139)
HCT VFR BLD AUTO: 43.5 % (ref 38.5–50)
HDLC SERPL-MCNC: 56 MG/DL
HGB BLD-MCNC: 14.8 G/DL (ref 13.2–17.1)
LDLC SERPL CALC-MCNC: 111 MG/DL (CALC)
LYMPHOCYTES # BLD AUTO: 1378 CELLS/UL (ref 850–3900)
LYMPHOCYTES NFR BLD AUTO: 24.6 %
MCH RBC QN AUTO: 31.2 PG (ref 27–33)
MCHC RBC AUTO-ENTMCNC: 34 G/DL (ref 32–36)
MCV RBC AUTO: 91.6 FL (ref 80–100)
MONOCYTES # BLD AUTO: 571 CELLS/UL (ref 200–950)
MONOCYTES NFR BLD AUTO: 10.2 %
NEUTROPHILS # BLD AUTO: 3427 CELLS/UL (ref 1500–7800)
NEUTROPHILS NFR BLD AUTO: 61.2 %
NONHDLC SERPL-MCNC: 129 MG/DL (CALC)
PLATELET # BLD AUTO: 226 THOUSAND/UL (ref 140–400)
PMV BLD REES-ECKER: 11.2 FL (ref 7.5–12.5)
POTASSIUM SERPL-SCNC: 4.3 MMOL/L (ref 3.5–5.3)
PROT SERPL-MCNC: 7.3 G/DL (ref 6.1–8.1)
QUEST EGFR NON-AFR. AMERICAN: 98 ML/MIN/1.73M2
RBC # BLD AUTO: 4.75 MILLION/UL (ref 4.2–5.8)
SODIUM SERPL-SCNC: 135 MMOL/L (ref 135–146)
TRIGL SERPL-MCNC: 89 MG/DL
TSH SERPL-ACNC: 3.17 MIU/L (ref 0.4–4.5)
WBC # BLD AUTO: 5.6 THOUSAND/UL (ref 3.8–10.8)

## 2020-02-10 ENCOUNTER — TELEPHONE (OUTPATIENT)
Dept: CARDIOLOGY | Facility: CLINIC | Age: 55
End: 2020-02-10

## 2020-02-10 ENCOUNTER — OFFICE VISIT (OUTPATIENT)
Dept: CARDIOLOGY | Facility: CLINIC | Age: 55
End: 2020-02-10
Payer: COMMERCIAL

## 2020-02-10 VITALS
HEIGHT: 72 IN | DIASTOLIC BLOOD PRESSURE: 80 MMHG | WEIGHT: 196 LBS | SYSTOLIC BLOOD PRESSURE: 110 MMHG | BODY MASS INDEX: 26.55 KG/M2 | HEART RATE: 57 BPM | OXYGEN SATURATION: 99 %

## 2020-02-10 DIAGNOSIS — I71.20 THORACIC AORTIC ANEURYSM WITHOUT RUPTURE (CMS/HCC): ICD-10-CM

## 2020-02-10 DIAGNOSIS — I10 ESSENTIAL HYPERTENSION: ICD-10-CM

## 2020-02-10 DIAGNOSIS — E78.2 MIXED HYPERLIPIDEMIA: Primary | ICD-10-CM

## 2020-02-10 PROCEDURE — 93000 ELECTROCARDIOGRAM COMPLETE: CPT | Performed by: INTERNAL MEDICINE

## 2020-02-10 PROCEDURE — 99214 OFFICE O/P EST MOD 30 MIN: CPT | Performed by: INTERNAL MEDICINE

## 2020-02-10 NOTE — ASSESSMENT & PLAN NOTE
Controlled on combination amlodipine and beta-blocker goal is systolic under 130 with dilated aortic root

## 2020-02-10 NOTE — ASSESSMENT & PLAN NOTE
CTA of chest September 2019 4.1 stable  Due for follow-up with Dr. Antonio in the fall 2021 no known obstructive CAD with coronary calcium score of 0 in 2015 repeat calcium score prior to next visit last echocardiogram October 2019 aortic root four-point 2 aortic root index of 2 mildly elevated normal LV systolic function follow-up echo next visit

## 2020-02-10 NOTE — LETTER
February 10, 2020     Kiah Messina MD  [address] [facility] [id number]    Patient: Jesus Oconnor  YOB: 1965  Date of Visit: 2/10/2020      Dear Delmis    Thank you for referring Jesus Oconnor to me for evaluation. Below are my notes for this consultation.    If you have questions, please do not hesitate to call me. I look forward to following your patient along with you.         Sincerely,        Hubert Schrader MD        CC: MD Raciel Hansen, Hubert BOSCH MD  2/10/2020 11:52 AM  Sign at close encounter      Cardiology Note    Kiah Hamilton MD          Jesus Oconnor is a 54 y.o. male 1965     He returns for follow-up he has history of thoracic aneurysm follows at [facility] with Dr. Antonio  Last seen November 2019 recommended repeat imaging and follow-up in 2 years  Aortic root varies from 4.1-4.4 on the measurements    Echocardiogram October 2019 stable aortic root four-point 2 aortic root index 2 cm/m² normal aortic valve  He has a history for hypertension mixed hyperlipidemia  No known coronary disease with a calcium score of 0 in 2015  His main medical complaint is tinnitus          Lab work February 2020 cholesterol 185 HDL 56   Creatinine 0.86 potassium 4.3  CBC normal                      dipine I made no changes today         ease alpful.    Patient Active Problem List    Diagnosis Date Noted   • Abnormal breathing 01/24/2019   • Mixed hyperlipidemia 01/24/2019   • Thoracic aortic aneurysm without rupture (CMS/HCC) 11/29/2018   • Essential hypertension 11/29/2018       Allergy    Losartan potassium          MED LIST       Current Outpatient Medications   Medication Sig Dispense Refill   • amLODIPine (NORVASC) 5 mg tablet Take 1 tablet (5 mg total) by mouth 2 (two) times a day. 90 tablet 3   • beclomethasone dipropionate (QVAR INHL) Inhale as needed.     • cetirizine (ZyrTEC) 10 mg tablet Take 10 mg by mouth  as needed.     • metoprolol succinate XL (TOPROL-XL) 50 mg 24 hr tablet Take 1 tablet (50 mg total) by mouth daily. 90 tablet 6   • metroNIDAZOLE (METROGEL) 1 % gel Apply topically nightly.  2   • mometasone (NASONEX) 50 mcg/actuation nasal spray Administer 1 spray into affected nostril(s) daily as needed.     • PROAIR RESPICLICK 90 mcg/actuation inhaler Inhale 2 puffs every 4 (four) hours as needed.  3   • PULMICORT FLEXHALER 90 mcg/actuation inhaler Inhale 1 puff daily as needed.  5     No current facility-administered medications for this visit.                 Review of Systems   Constitution: Negative for malaise/fatigue, weight gain and weight loss.   HENT: Negative for hearing loss and hoarse voice.    Eyes: Negative for visual disturbance.   Cardiovascular: Negative for chest pain, claudication, cyanosis, dyspnea on exertion, irregular heartbeat, leg swelling, near-syncope, orthopnea, palpitations, paroxysmal nocturnal dyspnea and syncope.   Respiratory: Negative for cough, hemoptysis, shortness of breath, sleep disturbances due to breathing, snoring, sputum production and wheezing.         Abnormal breathing at night according to his wife   Endocrine: Negative for cold intolerance and heat intolerance.   Hematologic/Lymphatic: Negative.  Negative for bleeding problem. Does not bruise/bleed easily.   Skin: Negative.  Negative for rash.   Musculoskeletal: Negative for arthritis, falls, joint pain, muscle cramps and myalgias.   Gastrointestinal: Negative for abdominal pain, anorexia, change in bowel habit, constipation, diarrhea, dysphagia, heartburn, jaundice and nausea.   Genitourinary: Negative for frequency and nocturia.   Neurological: Negative for dizziness, focal weakness, headaches, light-headedness, numbness, tremors and vertigo.   Psychiatric/Behavioral: Negative for memory loss. The patient does not have insomnia and is not nervous/anxious.    Allergic/Immunologic: Negative for hives.       Labs    Lab Results   Component Value Date    WBC 5.6 02/06/2020    HGB 14.8 02/06/2020    HCT 43.5 02/06/2020     02/06/2020    CHOL 185 02/06/2020    TRIG 89 02/06/2020    HDL 56 02/06/2020    LDLCALC 111 (H) 02/06/2020    ALT 14 02/06/2020    AST 17 02/06/2020     02/06/2020    K 4.3 02/06/2020     02/06/2020    CREATININE 0.86 02/06/2020    BUN 11 02/06/2020    CO2 27 02/06/2020    TSH 3.17 02/06/2020    HGBA1C 5.0 02/29/2016       Lab Results   Component Value Date    GLUCOSE 104 02/06/2020    CALCIUM 9.2 02/06/2020     02/06/2020    K 4.3 02/06/2020    CO2 27 02/06/2020     02/06/2020    BUN 11 02/06/2020    CREATININE 0.86 02/06/2020         Objective   Vitals:    02/10/20 0815   BP: 110/80   Pulse: (!) 57   SpO2: 99%   Weight: 88.9 kg (196 lb)   Height: 1.829 m (6')     Physical Exam   Constitutional: He is oriented to person, place, and time. He appears well-developed and well-nourished. He is active.  Non-toxic appearance. He does not have a sickly appearance. He does not appear ill. No distress. He is not intubated.   HENT:   Head: Normocephalic. Not microcephalic. Head is without raccoon's eyes, without abrasion and without contusion.   Nose: Nose normal.   Eyes: EOM are normal. Left eye exhibits no discharge and no exudate. Right conjunctiva is not injected. Left conjunctiva is not injected. Left conjunctiva has no hemorrhage. No scleral icterus. Pupils are equal.   Neck: Normal range of motion. Neck supple. Normal carotid pulses and no hepatojugular reflux present. Carotid bruit is not present.   Cardiovascular: Normal rate, regular rhythm, normal heart sounds, intact distal pulses and normal pulses. PMI is not displaced. Exam reveals no gallop and no friction rub.   No murmur heard.  Pulmonary/Chest: Effort normal and breath sounds normal. No stridor. No apnea, no tachypnea and no bradypnea. He is not intubated. No respiratory distress. He has no wheezes. He has no rales. He  exhibits no tenderness.   Abdominal: Soft. Normal appearance, normal aorta and bowel sounds are normal. He exhibits no distension. There is no tenderness.   Musculoskeletal: Normal range of motion. He exhibits no edema or deformity.   Neurological: He is alert and oriented to person, place, and time.   Skin: No abrasion, no bruising, no ecchymosis and no rash noted. He is not diaphoretic. No erythema. No pallor.   Psychiatric: He has a normal mood and affect. His mood appears not anxious. His affect is not angry, not blunt, not labile and not inappropriate. His speech is not slurred. He is not agitated, not aggressive, not withdrawn and not combative. He does not exhibit a depressed mood. He is communicative.         Cardiac Procedures  Procedures     Cardiovascular Procedures:  ECHO/MUGA:   Echo (Normal EF, ao root 3.9  redundant atrial septum) - 5/20/2016    Echo (aortic root 4.3 nl ef) - oct 2019  aorta 4.2 ar index 2  Nl ev av normal  no change        VASCULAR:    CT/MRI:  Cardiac Calcium Score (Total 0:, aortic root 4.0) - 5/12/2015     Chest CT (thoracic aorta 4.3 liver cyst) - 11/2/2015     Chest CT (aortic root 4.4 ( was 4.3 11.15)no change feb 2015, 2mm charley nodule no change 2015) - 12/19/2016     Chest CT (cta 4.2 michael repeat 2 years) - aortic root index 2.0 5/2017    cta 9/19 aorta 4.1 stable  :      EKG      Assessment/Plan:      Thoracic aortic aneurysm without rupture (CMS/HCC)  CTA of chest September 2019 4.1 stable  Due for follow-up with Dr. Antonio in the fall 2021 no known obstructive CAD with coronary calcium score of 0 in 2015 repeat calcium score prior to next visit last echocardiogram October 2019 aortic root four-point 2 aortic root index of 2 mildly elevated normal LV systolic function follow-up echo next visit    Mixed hyperlipidemia  LDL cholesterol crept up a little bit of 111 again he has no known CAD with coronary calcium score of 0 back in 2015 try natural therapy with plant stanols  and diet if not improved in 6 months to consider statin    Essential hypertension  Controlled on combination amlodipine and beta-blocker goal is systolic under 130 with dilated aortic root     I  Still crept up a little bit he has no known obstructive CAD with coronary calciumscore of 0 in 2015  Put on natural therapy with plant stanols  He is very try to lose some weight  Follow-up in 6 months with coronary calcium score  And rest echo at visit  Due for follow-up CTA and follow-up with Dr. Antonio September 2021        Thank you for allowing me to participate in the care of this patient.  I hope this information is helpful.    Hubert Schrader MD [facility]   2/10/2020  Cc Kiah Hamilton MD

## 2020-02-10 NOTE — ASSESSMENT & PLAN NOTE
LDL cholesterol crept up a little bit of 111 again he has no known CAD with coronary calcium score of 0 back in 2015 try natural therapy with plant stanols and diet if not improved in 6 months to consider statin

## 2020-08-10 RX ORDER — AMLODIPINE BESYLATE 5 MG/1
TABLET ORAL
Qty: 180 TABLET | Refills: 1 | Status: SHIPPED | OUTPATIENT
Start: 2020-08-10 | End: 2021-02-15

## 2020-08-11 RX ORDER — METOPROLOL SUCCINATE 50 MG/1
TABLET, EXTENDED RELEASE ORAL
Qty: 90 TABLET | Refills: 1 | Status: SHIPPED | OUTPATIENT
Start: 2020-08-11 | End: 2020-08-26 | Stop reason: SDUPTHER

## 2020-08-26 ENCOUNTER — TELEPHONE (OUTPATIENT)
Dept: CARDIOLOGY | Facility: CLINIC | Age: 55
End: 2020-08-26

## 2020-08-26 RX ORDER — METOPROLOL SUCCINATE 50 MG/1
50 TABLET, EXTENDED RELEASE ORAL
Qty: 90 TABLET | Refills: 1 | Status: SHIPPED | OUTPATIENT
Start: 2020-08-26 | End: 2021-02-15

## 2020-10-02 ENCOUNTER — HOSPITAL ENCOUNTER (OUTPATIENT)
Dept: RADIOLOGY | Age: 55
Discharge: HOME | End: 2020-10-02
Attending: INTERNAL MEDICINE
Payer: COMMERCIAL

## 2020-10-02 DIAGNOSIS — I71.20 THORACIC AORTIC ANEURYSM WITHOUT RUPTURE (CMS/HCC): ICD-10-CM

## 2020-10-02 DIAGNOSIS — E78.2 MIXED HYPERLIPIDEMIA: ICD-10-CM

## 2020-10-02 DIAGNOSIS — I10 ESSENTIAL HYPERTENSION: ICD-10-CM

## 2020-10-02 PROCEDURE — 75571 CT HRT W/O DYE W/CA TEST: CPT

## 2020-10-05 ENCOUNTER — TELEPHONE (OUTPATIENT)
Dept: CARDIOLOGY | Facility: CLINIC | Age: 55
End: 2020-10-05

## 2020-10-05 LAB
ALBUMIN SERPL-MCNC: 4.5 G/DL (ref 3.6–5.1)
ALBUMIN/GLOB SERPL: 1.4 (CALC) (ref 1–2.5)
ALP SERPL-CCNC: 48 U/L (ref 35–144)
ALT SERPL-CCNC: 14 U/L (ref 9–46)
AST SERPL-CCNC: 19 U/L (ref 10–35)
BILIRUB SERPL-MCNC: 0.7 MG/DL (ref 0.2–1.2)
BUN SERPL-MCNC: 10 MG/DL (ref 7–25)
BUN/CREAT SERPL: NORMAL (CALC) (ref 6–22)
CALCIUM SERPL-MCNC: 9.8 MG/DL (ref 8.6–10.3)
CHLORIDE SERPL-SCNC: 101 MMOL/L (ref 98–110)
CHOLEST SERPL-MCNC: 185 MG/DL
CHOLEST/HDLC SERPL: 3 (CALC)
CO2 SERPL-SCNC: 27 MMOL/L (ref 20–32)
CREAT SERPL-MCNC: 0.87 MG/DL (ref 0.7–1.33)
GLOBULIN SER CALC-MCNC: 3.3 G/DL (CALC) (ref 1.9–3.7)
GLUCOSE SERPL-MCNC: 79 MG/DL (ref 65–139)
HDLC SERPL-MCNC: 62 MG/DL
LDLC SERPL CALC-MCNC: 104 MG/DL (CALC)
NONHDLC SERPL-MCNC: 123 MG/DL (CALC)
POTASSIUM SERPL-SCNC: 4.5 MMOL/L (ref 3.5–5.3)
PROT SERPL-MCNC: 7.8 G/DL (ref 6.1–8.1)
QUEST EGFR NON-AFR. AMERICAN: 97 ML/MIN/1.73M2
SODIUM SERPL-SCNC: 136 MMOL/L (ref 135–146)
TRIGL SERPL-MCNC: 98 MG/DL

## 2020-10-05 NOTE — TELEPHONE ENCOUNTER
Spoke to pt, his coronary calcium score remains at 0. Dr. Schrader will see pt at his appt on 10/21/2020. Pt understands, no further questions.

## 2020-10-15 ENCOUNTER — TELEPHONE (OUTPATIENT)
Dept: INTERNAL MEDICINE | Facility: CLINIC | Age: 55
End: 2020-10-15

## 2020-10-15 NOTE — TELEPHONE ENCOUNTER
Patient has applied for life insurance and needs to have a urinalysis done.  He will go to [place].

## 2020-10-16 DIAGNOSIS — R35.0 URINARY FREQUENCY: Primary | ICD-10-CM

## 2020-10-19 ASSESSMENT — ENCOUNTER SYMPTOMS
ABDOMINAL PAIN: 0
INSOMNIA: 0
SYNCOPE: 0
WHEEZING: 0
CONSTIPATION: 0
NUMBNESS: 0
IRREGULAR HEARTBEAT: 0
NEAR-SYNCOPE: 0
SHORTNESS OF BREATH: 0
CLAUDICATION: 0
NERVOUS/ANXIOUS: 0
FOCAL WEAKNESS: 0
FALLS: 0
VERTIGO: 0
CHANGE IN BOWEL HABIT: 0
COUGH: 0
SNORING: 0
HEADACHES: 0
DIZZINESS: 0
PALPITATIONS: 0
JAUNDICE: 0
MYALGIAS: 0
TREMORS: 0
MEMORY LOSS: 0
DIARRHEA: 0
HOARSE VOICE: 0
ORTHOPNEA: 0
HEMATOLOGIC/LYMPHATIC NEGATIVE: 1
ANOREXIA: 0
DYSPNEA ON EXERTION: 0
WEIGHT GAIN: 0
NAUSEA: 0
MUSCLE CRAMPS: 0
PND: 0
LIGHT-HEADEDNESS: 0
SPUTUM PRODUCTION: 0
FREQUENCY: 0
WEIGHT LOSS: 0
HEMOPTYSIS: 0
SLEEP DISTURBANCES DUE TO BREATHING: 0
BRUISES/BLEEDS EASILY: 0
HEARTBURN: 0

## 2020-10-19 NOTE — PROGRESS NOTES
Cardiology Note    Kiah Hamilton MD          Jesus Oconnor is a 54 y.o. male 1965     He returns for follow-up of his thoracic aneurysm and echocardiogram performed today images personally reviewed  Sees Dr. Antonio [facility] due for imaging with follow-up in the fall 2021  Aneurysm last imaged by CTA in 2019 was 4.1 by noncontrast CAT scan of the calcium score in October 2020 was 4.5 cm   he has no known obstructive CAD with a score of 0 and then image  He has mixed hyperlipidemia mild LDL cholesterol 111 on natural therapy  And hypertension  Echocardiogram today October 2020 aortic root 4.1 cm  He had genetic evaluation in 2019 they felt that the genetic testing would not be helpful for him  Of interest he told me his brother's son was recently diagnosed with dilated thoracic aorta  He knows to have his family checked      Lab work October 2020 cholesterol 185  HDL 60  Creatinine 0.87 potassium 4.5  He has no cardiovascular complaints                            Patient Active Problem List    Diagnosis Date Noted   • Family history of thoracic aortic aneurysm 10/21/2020   • Abnormal breathing 01/24/2019   • Mixed hyperlipidemia 01/24/2019   • Thoracic aortic aneurysm without rupture (CMS/HCC) 11/29/2018   • Essential hypertension 11/29/2018       Allergy    Losartan potassium          MED LIST       Current Outpatient Medications   Medication Sig Dispense Refill   • amLODIPine (NORVASC) 5 mg tablet TAKE 1 TABLET BY MOUTH TWICE A  tablet 1   • beclomethasone dipropionate (QVAR INHL) Inhale as needed.     • cetirizine (ZyrTEC) 10 mg tablet Take 10 mg by mouth as needed.     • metoprolol succinate XL (TOPROL-XL) 50 mg 24 hr tablet Take 1 tablet (50 mg total) by mouth once daily. 90 tablet 1   • mometasone (NASONEX) 50 mcg/actuation nasal spray Administer 1 spray into affected nostril(s) daily as needed.     • PROAIR RESPICLICK 90 mcg/actuation inhaler Inhale 2  puffs every 4 (four) hours as needed.  3   • metroNIDAZOLE (METROGEL) 1 % gel Apply topically nightly.  2   • PULMICORT FLEXHALER 90 mcg/actuation inhaler Inhale 1 puff daily as needed.  5     No current facility-administered medications for this visit.                 Review of Systems   Constitution: Negative for malaise/fatigue, weight gain and weight loss.   HENT: Negative for hearing loss and hoarse voice.    Eyes: Negative for visual disturbance.   Cardiovascular: Negative for chest pain, claudication, cyanosis, dyspnea on exertion, irregular heartbeat, leg swelling, near-syncope, orthopnea, palpitations, paroxysmal nocturnal dyspnea and syncope.   Respiratory: Negative for cough, hemoptysis, shortness of breath, sleep disturbances due to breathing, snoring, sputum production and wheezing.         Abnormal breathing at night according to his wife   Endocrine: Negative for cold intolerance and heat intolerance.   Hematologic/Lymphatic: Negative.  Negative for bleeding problem. Does not bruise/bleed easily.   Skin: Negative.  Negative for rash.   Musculoskeletal: Negative for arthritis, falls, joint pain, muscle cramps and myalgias.   Gastrointestinal: Negative for abdominal pain, anorexia, change in bowel habit, constipation, diarrhea, dysphagia, heartburn, jaundice and nausea.   Genitourinary: Negative for frequency and nocturia.   Neurological: Negative for dizziness, focal weakness, headaches, light-headedness, numbness, tremors and vertigo.   Psychiatric/Behavioral: Negative for memory loss. The patient does not have insomnia and is not nervous/anxious.    Allergic/Immunologic: Negative for hives.       Labs   Lab Results   Component Value Date    WBC 5.6 02/06/2020    HGB 14.8 02/06/2020    HCT 43.5 02/06/2020     02/06/2020    CHOL 185 10/05/2020    TRIG 98 10/05/2020    HDL 62 10/05/2020    LDLCALC 104 (H) 10/05/2020    ALT 14 10/05/2020    AST 19 10/05/2020     10/05/2020    K 4.5  10/05/2020     10/05/2020    CREATININE 0.87 10/05/2020    BUN 10 10/05/2020    CO2 27 10/05/2020    TSH 3.17 02/06/2020    HGBA1C 5.0 02/29/2016       Lab Results   Component Value Date    GLUCOSE 79 10/05/2020    CALCIUM 9.8 10/05/2020     10/05/2020    K 4.5 10/05/2020    CO2 27 10/05/2020     10/05/2020    BUN 10 10/05/2020    CREATININE 0.87 10/05/2020         Objective   Vitals:    10/21/20 0857   BP: (!) 122/98   BP Location: Left upper arm   Patient Position: Sitting   Pulse: 63   SpO2: 98%   Weight: 85.3 kg (188 lb)   Height: 1.829 m (6')     Physical Exam   Constitutional: He is oriented to person, place, and time. He appears well-developed and well-nourished. He is active.  Non-toxic appearance. He does not have a sickly appearance. He does not appear ill. No distress. He is not intubated.   HENT:   Head: Normocephalic. Not microcephalic. Head is without raccoon's eyes, without abrasion and without contusion.   Nose: Nose normal.   Eyes: EOM are normal. Left eye exhibits no discharge and no exudate. Right conjunctiva is not injected. Left conjunctiva is not injected. Left conjunctiva has no hemorrhage. No scleral icterus. Pupils are equal.   Neck: Normal range of motion. Neck supple. Normal carotid pulses and no hepatojugular reflux present. Carotid bruit is not present.   Cardiovascular: Normal rate, regular rhythm, normal heart sounds, intact distal pulses and normal pulses. PMI is not displaced. Exam reveals no gallop and no friction rub.   No murmur heard.  Pulmonary/Chest: Effort normal and breath sounds normal. No stridor. No apnea, no tachypnea and no bradypnea. He is not intubated. No respiratory distress. He has no wheezes. He has no rales. He exhibits no tenderness.   Abdominal: Soft. Normal appearance, normal aorta and bowel sounds are normal. He exhibits no distension. There is no abdominal tenderness.   Musculoskeletal: Normal range of motion.         General: No deformity  or edema.   Neurological: He is alert and oriented to person, place, and time.   Skin: No abrasion, no bruising, no ecchymosis and no rash noted. He is not diaphoretic. No erythema. No pallor.   Psychiatric: He has a normal mood and affect. His mood appears not anxious. His affect is not angry, not blunt, not labile and not inappropriate. His speech is not slurred. He is not agitated, not aggressive, not withdrawn and not combative. He does not exhibit a depressed mood. He is communicative.         Cardiac Procedures  Procedures     Cardiovascular Procedures:  ECHO/MUGA:   Echo (Normal EF, ao root 3.9  redundant atrial septum) - 5/20/2016    Echo (aortic root 4.3 nl ef) - oct 2020  aorta 4.21ar index 2  Nl ev av normal  no change        VASCULAR:    CT/MRI:  Cardiac Calcium Score (Total 0:, aortic root 4.0) - 5/12/2015     Chest CT (thoracic aorta 4.3 liver cyst) - 11/2/2015     Chest CT (aortic root 4.4 ( was 4.3 11.15)no change feb 2015, 2mm charley nodule no change 2015) - 12/19/2016     Chest CT (cta 4.2 michael repeat 2 years) - aortic root index 2.0 5/2017    cta 9/19 aorta 4.1 stable    Cor maribel score  Oct 2020  zero aorta 4.5 stable oct 2020  :      Other   Genetic eval at [place] 2019 non revealing    KG OCT 2020 [facility]      Assessment/Plan:      Thoracic aortic aneurysm without rupture (CMS/HCC)  He has thoracic aneurysm with structurally normal aortic valve  He is followed at [facility] last CTA September 2019 4.1 sometimes noncontrast CAT scan and measures 4.5  Echocardiogram today 4.1, October 2020 aortic root index 2.1 cm/m²  He is due for imaging again with CT angiogram in October 2021 and following up with Dr. Antonio  He had genetic assessment back in 2019 with Dr. Carlisle who felt genetic testing would not be helpful at this time of note his brother son was recently diagnosed with dilated thoracic aorta  He himself has no coronary disease with calcium score of 0 October 2020  Normal  aortic valve normal echocardiogram October 2020    Mixed hyperlipidemia  At goal for primary prevention     Essential hypertension  Controlled on combination of calcium blocker beta-blocker          Echo today stable aortic root 4.1 on echo evaluation 4.51 noncontrast CT  Stable diameter this was done October persistent calcium score of 0  Cholesterol at goal for primary prevention or no pharmaceuticals  Follows up with Dr. Antonio in October 2021 2-year intervals  Of note he recently told me his nephew, his brother's son was diagnosed with dilated aortic root and is having serial studies he knows to have his brother checked  I will see him back in 1 year with no imaging on my part because he will  have CT[facility] his chest [facility] at [facility]            This letter was generated using speech recognition software.  Please excuse any typographical errors.    Thank you for allowing me to participate in the care of this patient.  I hope this information is helpful.    Hubert Schrader MD [facility]   10/21/2020  Cc Kiah Hamilton MD

## 2020-10-21 ENCOUNTER — OFFICE VISIT (OUTPATIENT)
Dept: CARDIOLOGY | Facility: CLINIC | Age: 55
End: 2020-10-21
Payer: COMMERCIAL

## 2020-10-21 ENCOUNTER — HOSPITAL ENCOUNTER (OUTPATIENT)
Dept: CARDIOLOGY | Facility: CLINIC | Age: 55
Discharge: HOME | End: 2020-10-21
Attending: INTERNAL MEDICINE
Payer: COMMERCIAL

## 2020-10-21 VITALS
HEIGHT: 72 IN | SYSTOLIC BLOOD PRESSURE: 110 MMHG | BODY MASS INDEX: 26.55 KG/M2 | WEIGHT: 196 LBS | DIASTOLIC BLOOD PRESSURE: 80 MMHG

## 2020-10-21 VITALS
HEART RATE: 63 BPM | WEIGHT: 188 LBS | HEIGHT: 72 IN | DIASTOLIC BLOOD PRESSURE: 88 MMHG | BODY MASS INDEX: 25.47 KG/M2 | SYSTOLIC BLOOD PRESSURE: 122 MMHG | OXYGEN SATURATION: 98 %

## 2020-10-21 DIAGNOSIS — I10 ESSENTIAL HYPERTENSION: ICD-10-CM

## 2020-10-21 DIAGNOSIS — E78.2 MIXED HYPERLIPIDEMIA: ICD-10-CM

## 2020-10-21 DIAGNOSIS — I71.20 THORACIC AORTIC ANEURYSM WITHOUT RUPTURE (CMS/HCC): ICD-10-CM

## 2020-10-21 DIAGNOSIS — I71.20 THORACIC AORTIC ANEURYSM WITHOUT RUPTURE (CMS/HCC): Primary | ICD-10-CM

## 2020-10-21 DIAGNOSIS — R06.9 ABNORMAL BREATHING: ICD-10-CM

## 2020-10-21 DIAGNOSIS — Z82.49 FAMILY HISTORY OF THORACIC AORTIC ANEURYSM: ICD-10-CM

## 2020-10-21 LAB
AORTIC ROOT ANNULUS: 4.1 CM
AORTIC VALVE MEAN VELOCITY: 0.97 M/S
AORTIC VALVE VELOCITY TIME INTEGRAL: 30.4 CM
ASCENDING AORTA: 3.9 CM
AV MEAN GRADIENT: 4 MMHG
AV PEAK GRADIENT: 6 MMHG
AV PEAK VELOCITY-S: 1.27 M/S
AV VALVE AREA: 3.09 CM2
BSA FOR ECHO PROCEDURE: 2.13 M2
CUSP SEPARATION: 2.5 CM
DOP CALC LVOT STROKE VOLUME: 94.05 ML
E WAVE DECELERATION TIME: 285 MS
E/A RATIO: 1.2
E/E' RATIO: 4.7
E/LAT E' RATIO: 4
EDV (BP): 82.2 CM3
EF (A4C): 71.4 %
EF A2C: 57.2 %
EJECTION FRACTION: 65.3 %
ESV (BP): 28.5 CM3
FRACTIONAL SHORTENING: 41.9 %
INTERVENTRICULAR SEPTUM: 0.84 CM
LA ESV (BP): 35.7 CM3
LA ESV INDEX (A2C): 15.87 CM3/M2
LA ESV INDEX (BP): 16.76 CM3/M2
LA/AORTA RATIO: 0.78
LAAS-AP2: 13.8 CM2
LAAS-AP4: 14.8 CM2
LAD 2D: 3.1 CM
LALD A4C: 4.48 CM
LALD A4C: 5.2 CM
LAV-S: 33.8 CM3
LEFT ATRIUM VOLUME INDEX: 15.59 CM3/M2
LEFT ATRIUM VOLUME: 33.2 CM3
LEFT INTERNAL DIMENSION IN SYSTOLE: 3.04 CM (ref 3.15–4.77)
LEFT VENTRICLE DIASTOLIC VOLUME INDEX: 45.92 CM3/M2
LEFT VENTRICLE DIASTOLIC VOLUME: 97.8 CM3
LEFT VENTRICLE SYSTOLIC VOLUME INDEX: 13.15 CM3/M2
LEFT VENTRICLE SYSTOLIC VOLUME: 28 CM3
LEFT VENTRICULAR INTERNAL DIMENSION IN DIASTOLE: 5.23 CM (ref 5.37–7.46)
LEFT VENTRICULAR POSTERIOR WALL IN END DIASTOLE: 0.87 CM (ref 0.69–1.28)
LV DIASTOLIC VOLUME: 65.3 CM3
LV ESV (APICAL 2 CHAMBER): 27.9 CM3
LVAD-AP2: 25.4 CM2
LVAD-AP4: 32.8 CM2
LVAS-AP2: 14.8 CM2
LVAS-AP4: 15 CM2
LVEDVI(A2C): 30.66 CM3/M2
LVEDVI(BP): 38.59 CM3/M2
LVESVI(A2C): 13.1 CM3/M2
LVESVI(BP): 13.38 CM3/M2
LVLD-AP2: 8.62 CM
LVLD-AP4: 9.24 CM
LVLS-AP2: 6.69 CM
LVLS-AP4: 6.98 CM
LVOT 2D: 2.4 CM
LVOT A: 4.52 CM2
LVOT MG: 2 MMHG
LVOT MV: 0.73 M/S
LVOT PEAK VELOCITY: 1.02 M/S
LVOT VTI: 20.8 CM
MV E'TISSUE VEL-LAT: 0.13 M/S
MV E'TISSUE VEL-MED: 0.11 M/S
MV PEAK A VEL: 0.42 M/S
MV PEAK E VEL: 0.51 M/S
MV VALVE AREA P 1/2 METHOD: 2.62 CM2
POSTERIOR WALL: 0.87 CM
PV PEAK GRADIENT: 1 MMHG
PV PV: 0.44 M/S
SEPTAL TISSUE DOPPLER FREE WALL LATE DIA VELOCITY (APICAL 4 CHAMBER VIEW): 0.12 M/S
TR MAX PG: 18 MMHG
TRICUSPID VALVE PEAK REGURGITATION VELOCITY: 2.13 M/S
Z-SCORE OF LEFT VENTRICULAR DIMENSION IN END DIASTOLE: -1.91
Z-SCORE OF LEFT VENTRICULAR DIMENSION IN END SYSTOLE: -1.92
Z-SCORE OF LEFT VENTRICULAR POSTERIOR WALL IN END DIASTOLE: -0.37

## 2020-10-21 PROCEDURE — 93000 ELECTROCARDIOGRAM COMPLETE: CPT | Performed by: INTERNAL MEDICINE

## 2020-10-21 PROCEDURE — 93306 TTE W/DOPPLER COMPLETE: CPT | Performed by: INTERNAL MEDICINE

## 2020-10-21 PROCEDURE — 99214 OFFICE O/P EST MOD 30 MIN: CPT | Mod: 25 | Performed by: INTERNAL MEDICINE

## 2020-10-21 NOTE — LETTER
October 21, 2020     Kiah Messina MD  [address] [id number]    Patient: Jesus Oconnor  YOB: 1965  Date of Visit: 10/21/2020      Dear Delmis    Thank you for referring Jesus Oconnor to me for evaluation. Below are my notes for this consultation.    If you have questions, please do not hesitate to call me. I look forward to following your patient along with you.         Sincerely,        Hubert Schrader MD        CC: MD Raciel Hansen, Hubert BOSCH MD  10/21/2020  9:46 AM  Sign when Signing Visit      Cardiology Note    Kiah Hamilton MD          Jesus Oconnor is a 54 y.o. male 1965     He returns for follow-up of his thoracic aneurysm and echocardiogram performed today images personally reviewed  Sees Dr. Antonio [facility] due for imaging with follow-up in the fall 2021  Aneurysm last imaged by CTA in 2019 was 4.1 by noncontrast CAT scan of the calcium score in October 2020 was 4.5 cm   he has no known obstructive CAD with a score of 0 and then image  He has mixed hyperlipidemia mild LDL cholesterol 111 on natural therapy  And hypertension  Echocardiogram today October 2020 aortic root 4.1 cm  He had genetic evaluation in 2019 they felt that the genetic testing would not be helpful for him  Of interest he told me his brother's son was recently diagnosed with dilated thoracic aorta  He knows to have his family checked      Lab work October 2020 cholesterol 185  HDL 60  Creatinine 0.87 potassium 4.5  He has no cardiovascular complaints                            Patient Active Problem List    Diagnosis Date Noted   • Family history of thoracic aortic aneurysm 10/21/2020   • Abnormal breathing 01/24/2019   • Mixed hyperlipidemia 01/24/2019   • Thoracic aortic aneurysm without rupture (CMS/HCC) 11/29/2018   • Essential hypertension 11/29/2018       Allergy    Losartan potassium          MED LIST       Current  Outpatient Medications   Medication Sig Dispense Refill   • amLODIPine (NORVASC) 5 mg tablet TAKE 1 TABLET BY MOUTH TWICE A  tablet 1   • beclomethasone dipropionate (QVAR INHL) Inhale as needed.     • cetirizine (ZyrTEC) 10 mg tablet Take 10 mg by mouth as needed.     • metoprolol succinate XL (TOPROL-XL) 50 mg 24 hr tablet Take 1 tablet (50 mg total) by mouth once daily. 90 tablet 1   • mometasone (NASONEX) 50 mcg/actuation nasal spray Administer 1 spray into affected nostril(s) daily as needed.     • PROAIR RESPICLICK 90 mcg/actuation inhaler Inhale 2 puffs every 4 (four) hours as needed.  3   • metroNIDAZOLE (METROGEL) 1 % gel Apply topically nightly.  2   • PULMICORT FLEXHALER 90 mcg/actuation inhaler Inhale 1 puff daily as needed.  5     No current facility-administered medications for this visit.                 Review of Systems   Constitution: Negative for malaise/fatigue, weight gain and weight loss.   HENT: Negative for hearing loss and hoarse voice.    Eyes: Negative for visual disturbance.   Cardiovascular: Negative for chest pain, claudication, cyanosis, dyspnea on exertion, irregular heartbeat, leg swelling, near-syncope, orthopnea, palpitations, paroxysmal nocturnal dyspnea and syncope.   Respiratory: Negative for cough, hemoptysis, shortness of breath, sleep disturbances due to breathing, snoring, sputum production and wheezing.         Abnormal breathing at night according to his wife   Endocrine: Negative for cold intolerance and heat intolerance.   Hematologic/Lymphatic: Negative.  Negative for bleeding problem. Does not bruise/bleed easily.   Skin: Negative.  Negative for rash.   Musculoskeletal: Negative for arthritis, falls, joint pain, muscle cramps and myalgias.   Gastrointestinal: Negative for abdominal pain, anorexia, change in bowel habit, constipation, diarrhea, dysphagia, heartburn, jaundice and nausea.   Genitourinary: Negative for frequency and nocturia.   Neurological:  Negative for dizziness, focal weakness, headaches, light-headedness, numbness, tremors and vertigo.   Psychiatric/Behavioral: Negative for memory loss. The patient does not have insomnia and is not nervous/anxious.    Allergic/Immunologic: Negative for hives.       Labs   Lab Results   Component Value Date    WBC 5.6 02/06/2020    HGB 14.8 02/06/2020    HCT 43.5 02/06/2020     02/06/2020    CHOL 185 10/05/2020    TRIG 98 10/05/2020    HDL 62 10/05/2020    LDLCALC 104 (H) 10/05/2020    ALT 14 10/05/2020    AST 19 10/05/2020     10/05/2020    K 4.5 10/05/2020     10/05/2020    CREATININE 0.87 10/05/2020    BUN 10 10/05/2020    CO2 27 10/05/2020    TSH 3.17 02/06/2020    HGBA1C 5.0 02/29/2016       Lab Results   Component Value Date    GLUCOSE 79 10/05/2020    CALCIUM 9.8 10/05/2020     10/05/2020    K 4.5 10/05/2020    CO2 27 10/05/2020     10/05/2020    BUN 10 10/05/2020    CREATININE 0.87 10/05/2020         Objective   Vitals:    10/21/20 0857   BP: (!) 122/98   BP Location: Left upper arm   Patient Position: Sitting   Pulse: 63   SpO2: 98%   Weight: 85.3 kg (188 lb)   Height: 1.829 m (6')     Physical Exam   Constitutional: He is oriented to person, place, and time. He appears well-developed and well-nourished. He is active.  Non-toxic appearance. He does not have a sickly appearance. He does not appear ill. No distress. He is not intubated.   HENT:   Head: Normocephalic. Not microcephalic. Head is without raccoon's eyes, without abrasion and without contusion.   Nose: Nose normal.   Eyes: EOM are normal. Left eye exhibits no discharge and no exudate. Right conjunctiva is not injected. Left conjunctiva is not injected. Left conjunctiva has no hemorrhage. No scleral icterus. Pupils are equal.   Neck: Normal range of motion. Neck supple. Normal carotid pulses and no hepatojugular reflux present. Carotid bruit is not present.   Cardiovascular: Normal rate, regular rhythm, normal heart  sounds, intact distal pulses and normal pulses. PMI is not displaced. Exam reveals no gallop and no friction rub.   No murmur heard.  Pulmonary/Chest: Effort normal and breath sounds normal. No stridor. No apnea, no tachypnea and no bradypnea. He is not intubated. No respiratory distress. He has no wheezes. He has no rales. He exhibits no tenderness.   Abdominal: Soft. Normal appearance, normal aorta and bowel sounds are normal. He exhibits no distension. There is no abdominal tenderness.   Musculoskeletal: Normal range of motion.         General: No deformity or edema.   Neurological: He is alert and oriented to person, place, and time.   Skin: No abrasion, no bruising, no ecchymosis and no rash noted. He is not diaphoretic. No erythema. No pallor.   Psychiatric: He has a normal mood and affect. His mood appears not anxious. His affect is not angry, not blunt, not labile and not inappropriate. His speech is not slurred. He is not agitated, not aggressive, not withdrawn and not combative. He does not exhibit a depressed mood. He is communicative.         Cardiac Procedures  Procedures     Cardiovascular Procedures:  ECHO/MUGA:   Echo (Normal EF, ao root 3.9  redundant atrial septum) - 5/20/2016    Echo (aortic root 4.3 nl ef) - oct 2020  aorta 4.21ar index 2  Nl ev av normal  no change        VASCULAR:    CT/MRI:  Cardiac Calcium Score (Total 0:, aortic root 4.0) - 5/12/2015     Chest CT (thoracic aorta 4.3 liver cyst) - 11/2/2015     Chest CT (aortic root 4.4 ( was 4.3 11.15)no change feb 2015, 2mm cahrley nodule no change 2015) - 12/19/2016     Chest CT (cta 4.2 bavaria repeat 2 years) - aortic root index 2.0 5/2017    cta 9/19 aorta 4.1 stable    Cor maribel score  Oct 2020  zero aorta 4.5 stable oct 2020  :      Other   Genetic eval at [place] 2019 non revealing    KG OCT 2020 [facility]      Assessment/Plan:      Thoracic aortic aneurysm without rupture (CMS/HCC)  He has thoracic aneurysm with structurally normal aortic  valve  He is followed at [facility] last CTA September 2019 4.1 sometimes noncontrast CAT scan and measures 4.5  Echocardiogram today 4.1, October 2020 aortic root index 2.1 cm/m²  He is due for imaging again with CT angiogram in October 2021 and following up with Dr. Antonio  He had genetic assessment back in 2019 with Dr. Carlisle who felt genetic testing would not be helpful at this time of note his brother son was recently diagnosed with dilated thoracic aorta  He himself has no coronary disease with calcium score of 0 October 2020  Normal aortic valve normal echocardiogram October 2020    Mixed hyperlipidemia  At goal for primary prevention     Essential hypertension  Controlled on combination of calcium blocker beta-blocker          Echo today stable aortic root 4.1 on echo evaluation 4.51 noncontrast CT  Stable diameter this was done October persistent calcium score of 0  Cholesterol at goal for primary prevention or no pharmaceuticals  Follows up with Dr. Antonio in October 2021 2-year intervals  Of note he recently told me his nephew, his brother's son was diagnosed with dilated aortic root and is having serial studies he knows to have his brother checked  I will see him back in 1 year with no imaging on my part because he will  have CTA of his chest prior at [facility]            This letter was generated using speech recognition software.  Please excuse any typographical errors.    Thank you for allowing me to participate in the care of this patient.  I hope this information is helpful.    Hubert Schrader MD [facility]   10/21/2020  Cc Kiah Hamilton MD

## 2020-10-21 NOTE — ASSESSMENT & PLAN NOTE
He has thoracic aneurysm with structurally normal aortic valve  He is followed at [facility] last CTA September 2019 4.1 sometimes noncontrast CAT scan and measures 4.5  Echocardiogram today 4.1, October 2020 aortic root index 2.1 cm/m²  He is due for imaging again with CT angiogram in October 2021 and following up with Dr. Antonio  He had genetic assessment back in 2019 with Dr. Carlisle who felt genetic testing would not be helpful at this time of note his brother son was recently diagnosed with dilated thoracic aorta  He himself has no coronary disease with calcium score of 0 October 2020  Normal aortic valve normal echocardiogram October 2020

## 2020-10-30 LAB
APPEARANCE UR: CLEAR
BACTERIA #/AREA URNS HPF: NORMAL /HPF
BACTERIA UR CULT: NORMAL
BILIRUB UR QL STRIP: NEGATIVE
COLOR UR: YELLOW
GLUCOSE UR QL STRIP: NEGATIVE
HGB UR QL STRIP: NEGATIVE
HYALINE CASTS #/AREA URNS LPF: NORMAL /LPF
KETONES UR QL STRIP: NEGATIVE
LEUKOCYTE ESTERASE UR QL STRIP: NEGATIVE
NITRITE UR QL STRIP: NEGATIVE
PH UR STRIP: 6 [PH] (ref 5–8)
PROT UR QL STRIP: NEGATIVE
RBC #/AREA URNS HPF: NORMAL /HPF
SP GR UR STRIP: 1.01 (ref 1–1.03)
SQUAMOUS #/AREA URNS HPF: NORMAL /HPF
WBC #/AREA URNS HPF: NORMAL /HPF

## 2020-10-31 ENCOUNTER — TELEPHONE (OUTPATIENT)
Dept: INTERNAL MEDICINE | Facility: CLINIC | Age: 55
End: 2020-10-31

## 2020-10-31 NOTE — TELEPHONE ENCOUNTER
Call to home, message left on unidentified voicemail for call back.       Spoke with pharmacy.  The prescriber is Jozef Mcduffie).       Please let Mr glasgow know that his urinalysis is normal

## 2021-02-17 ENCOUNTER — OFFICE VISIT (OUTPATIENT)
Dept: INTERNAL MEDICINE | Facility: CLINIC | Age: 56
End: 2021-02-17
Payer: COMMERCIAL

## 2021-02-17 VITALS
WEIGHT: 192 LBS | TEMPERATURE: 98.6 F | HEIGHT: 73 IN | OXYGEN SATURATION: 98 % | SYSTOLIC BLOOD PRESSURE: 110 MMHG | DIASTOLIC BLOOD PRESSURE: 80 MMHG | BODY MASS INDEX: 25.45 KG/M2 | HEART RATE: 67 BPM

## 2021-02-17 DIAGNOSIS — Z12.5 SCREENING FOR PROSTATE CANCER: ICD-10-CM

## 2021-02-17 DIAGNOSIS — E78.2 MIXED HYPERLIPIDEMIA: ICD-10-CM

## 2021-02-17 DIAGNOSIS — I10 ESSENTIAL HYPERTENSION: ICD-10-CM

## 2021-02-17 DIAGNOSIS — Z00.00 PHYSICAL EXAM: Primary | ICD-10-CM

## 2021-02-17 DIAGNOSIS — I71.20 THORACIC AORTIC ANEURYSM WITHOUT RUPTURE (CMS/HCC): ICD-10-CM

## 2021-02-17 PROCEDURE — 99396 PREV VISIT EST AGE 40-64: CPT | Performed by: INTERNAL MEDICINE

## 2021-02-17 RX ORDER — AZELASTINE 1 MG/ML
1 SPRAY, METERED NASAL 2 TIMES DAILY
Qty: 30 ML | Refills: 5 | Status: SHIPPED | OUTPATIENT
Start: 2021-02-17 | End: 2023-12-13

## 2021-02-17 NOTE — PROGRESS NOTES
Subjective      Patient ID: Jesus Oconnor is a 55 y.o. male.    He is here for physical exam.  Overall feels well.  Has been appropriately social distancing and wearing his mask during the pandemic.  Is awaiting his Covid vaccinations.  Today he complains of some discomfort in his right hand at the base of the thumb.  He does ride a bicycle and sometimes when he puts his hand on and the bar has discomfort and also when he moves his thumb outward.  Denies any other joint pain or swelling. Also has chronic nasal congestion - saw an allergist and is allergic to dust.  He continues to follow with Dr. Hubert Schrader for his thoracic aortic aneurysm.  Echo unchanged -Dilated aortic root of 4.1 cm and top normal aortic root index of 2 cm/m².  Follows with Dr. Antonio at [facility] .Will be due for his CT angiogram this year.  He has seen Dr. Carlisle in 2019 to consider genetic testing and it was  felt that that would be helpful at the time.  Of note  However is that his brother son was diagnosed with a dilated thoracic aorta and his brother will be getting checked.Denies any chest pain palpitation shortness of breath or lightheadedness. Last blood work was in October 2020.      The following have been reviewed and updated as appropriate in this visit:  Allergies  Meds  Problems       Past Medical History:   Diagnosis Date   • Asthma    • Hypertension      Past Surgical History:   Procedure Laterality Date   • ADENOIDECTOMY     • BACK SURGERY       Family History   Problem Relation Age of Onset   • Breast cancer Biological Mother    • Heart disease Biological Father    • Hypertension Biological Father    • Hyperlipidemia Biological Father    • No Known Problems Biological Brother    • Heart disease Mother's Brother    • Heart disease Paternal Grandfather      Social History     Socioeconomic History   • Marital status:      Spouse name: Not on file   • Number of children: Not on file   • Years of education: Not on file    • Highest education level: Not on file   Occupational History   • Not on file   Social Needs   • Financial resource strain: Not on file   • Food insecurity     Worry: Not on file     Inability: Not on file   • Transportation needs     Medical: Not on file     Non-medical: Not on file   Tobacco Use   • Smoking status: Never Smoker   • Smokeless tobacco: Never Used   Substance and Sexual Activity   • Alcohol use: Yes     Alcohol/week: 7.0 standard drinks     Types: 7 Standard drinks or equivalent per week   • Drug use: Defer   • Sexual activity: Defer   Lifestyle   • Physical activity     Days per week: Not on file     Minutes per session: Not on file   • Stress: Not on file   Relationships   • Social connections     Talks on phone: Not on file     Gets together: Not on file     Attends Buddhist service: Not on file     Active member of club or organization: Not on file     Attends meetings of clubs or organizations: Not on file     Relationship status: Not on file   • Intimate partner violence     Fear of current or ex partner: Not on file     Emotionally abused: Not on file     Physically abused: Not on file     Forced sexual activity: Not on file   Other Topics Concern   • Not on file   Social History Narrative   • Not on file       Review of Systems   Constitutional: Negative for appetite change, fatigue and unexpected weight change.   HENT: Negative for congestion, hearing loss and postnasal drip.    Eyes: Negative for pain and visual disturbance.   Respiratory: Negative for cough and shortness of breath.    Cardiovascular: Negative for chest pain, palpitations and leg swelling.   Gastrointestinal: Negative for abdominal pain, blood in stool, constipation and diarrhea.   Endocrine: Negative.    Genitourinary: Negative for difficulty urinating, discharge, dysuria, genital sores, hematuria and testicular pain.   Musculoskeletal: Negative for arthralgias, joint swelling and myalgias.   Skin: Negative for color  "change, pallor and rash.   Allergic/Immunologic: Negative for environmental allergies and food allergies.   Neurological: Negative for dizziness, weakness, light-headedness, numbness and headaches.   Hematological: Negative for adenopathy. Does not bruise/bleed easily.   Psychiatric/Behavioral: Negative for behavioral problems, decreased concentration and sleep disturbance. The patient is not nervous/anxious.        Allergies   Allergen Reactions   • Losartan Potassium      Other reaction(s): bumpy skin     Current Outpatient Medications   Medication Sig Dispense Refill   • amLODIPine (NORVASC) 5 mg tablet TAKE 1 TABLET BY MOUTH TWICE A  tablet 3   • beclomethasone dipropionate (QVAR INHL) Inhale as needed.     • cetirizine (ZyrTEC) 10 mg tablet Take 10 mg by mouth as needed.     • metoprolol succinate XL (TOPROL-XL) 50 mg 24 hr tablet TAKE 1 TABLET BY MOUTH EVERY DAY 90 tablet 3   • mometasone (NASONEX) 50 mcg/actuation nasal spray Administer 1 spray into affected nostril(s) daily as needed.     • PROAIR RESPICLICK 90 mcg/actuation inhaler Inhale 2 puffs every 4 (four) hours as needed.  3   • azelastine (ASTELIN) 137 mcg (0.1 %) nasal spray Administer 1 spray into each nostril 2 (two) times a day. Use in each nostril as directed. 30 mL 5     No current facility-administered medications for this visit.        Objective   Vitals:    02/17/21 0826   BP: 110/80   Pulse: 67   Temp: 37 °C (98.6 °F)   SpO2: 98%   Weight: 87.1 kg (192 lb)   Height: 1.842 m (6' 0.5\")       Physical Exam  Vitals signs and nursing note reviewed.   Constitutional:       Appearance: He is well-developed.   HENT:      Head: Normocephalic and atraumatic.      Right Ear: Tympanic membrane and ear canal normal.      Left Ear: Tympanic membrane and ear canal normal.   Eyes:      Conjunctiva/sclera: Conjunctivae normal.      Pupils: Pupils are equal, round, and reactive to light.   Neck:      Musculoskeletal: Normal range of motion and neck " supple.      Vascular: No carotid bruit.   Cardiovascular:      Rate and Rhythm: Normal rate and regular rhythm.      Pulses: Normal pulses.      Heart sounds: Normal heart sounds. No murmur.   Pulmonary:      Effort: Pulmonary effort is normal.      Breath sounds: Normal breath sounds.   Abdominal:      General: Bowel sounds are normal.      Palpations: Abdomen is soft. There is no mass.   Musculoskeletal:      Right lower leg: No edema.      Left lower leg: No edema.   Skin:     General: Skin is warm and dry.   Neurological:      Mental Status: He is alert and oriented to person, place, and time.   Psychiatric:         Behavior: Behavior normal.         Assessment/Plan   Problem List Items Addressed This Visit        Circulatory    Thoracic aortic aneurysm without rupture (CMS/HCC)     Remained stable.  Blood pressure well controlled.  We will continue to follow with Dr. Antonio At [facility].  Echocardiogram stable         Essential hypertension     Continues with good blood pressure control on his current regimen of Metoprolol and amlodipine.  Maintain low-sodium diet            Endocrine/Metabolic    Mixed hyperlipidemia     The 10-year ASCVD risk score (Evelyntyrel SEVERINO Jr., et al., 2013) is: 4.2%    Values used to calculate the score:      Age: 55 years      Sex: Male      Is Non- : No      Diabetic: No      Tobacco smoker: No      Systolic Blood Pressure: 110 mmHg      Is BP treated: Yes      HDL Cholesterol: 62 mg/dL      Total Cholesterol: 185 mg/dL  I reviewe in a statin benefit group  and has a  coronary calcium score of 0.  We will continue to work on heart healthy Mediterranean diet            Other    Physical exam - Primary     Well-apprearing.  Blood studies reviewed.Colonoscopy is current.  Advised urologic exam and referred Is done.to Dr. Saeid Ayala. She will obtain Shingrix and Tdap but he would like to wait until after his Covid vaccination series. screen for  hepatitis C at his next blood draw. Yearly skin exam with Dr. Bundy.               Kiah Messina MD    2/18/2021

## 2021-02-17 NOTE — ASSESSMENT & PLAN NOTE
The 10-year ASCVD risk score (Evelyn SEVERINO Jr., et al., 2013) is: 4.2%    Values used to calculate the score:      Age: 55 years      Sex: Male      Is Non- : No      Diabetic: No      Tobacco smoker: No      Systolic Blood Pressure: 110 mmHg      Is BP treated: Yes      HDL Cholesterol: 62 mg/dL      Total Cholesterol: 185 mg/dL  I reviewe in a statin benefit group  and has a  coronary calcium score of 0.  We will continue to work on heart healthy Mediterranean diet

## 2021-02-17 NOTE — PATIENT INSTRUCTIONS
Obtain a PSA  Follow with urology Dr. Saeid Ayala or DR. Clifford Ames for urology exam  ( [facility])  Obtain a full skin exam with Dr. Bundy  Colonoscopy due in January 2023  Dr. Jus Li  [phone]  Consider shingrix and t dap after the covid series  Try astelin nasal spray in additoion to nasonex

## 2021-02-18 PROBLEM — M79.646 THUMB PAIN: Status: ACTIVE | Noted: 2021-02-18

## 2021-02-18 PROBLEM — Z12.5 SCREENING FOR PROSTATE CANCER: Status: ACTIVE | Noted: 2021-02-17

## 2021-02-18 PROBLEM — J30.9 ALLERGIC RHINITIS: Status: ACTIVE | Noted: 2021-02-18

## 2021-02-18 ASSESSMENT — ENCOUNTER SYMPTOMS
EYE PAIN: 0
DIFFICULTY URINATING: 0
SHORTNESS OF BREATH: 0
DIZZINESS: 0
ENDOCRINE NEGATIVE: 1
MYALGIAS: 0
COLOR CHANGE: 0
HEADACHES: 0
FATIGUE: 0
LIGHT-HEADEDNESS: 0
BLOOD IN STOOL: 0
APPETITE CHANGE: 0
WEAKNESS: 0
DYSURIA: 0
HEMATURIA: 0
JOINT SWELLING: 0
ABDOMINAL PAIN: 0
DECREASED CONCENTRATION: 0
CONSTIPATION: 0
NUMBNESS: 0
BRUISES/BLEEDS EASILY: 0
PALPITATIONS: 0
ADENOPATHY: 0
DIARRHEA: 0
COUGH: 0
NERVOUS/ANXIOUS: 0
UNEXPECTED WEIGHT CHANGE: 0
SLEEP DISTURBANCE: 0
ARTHRALGIAS: 0

## 2021-02-18 NOTE — ASSESSMENT & PLAN NOTE
Continues with good blood pressure control on his current regimen of Metoprolol and amlodipine.  Maintain low-sodium diet

## 2021-02-18 NOTE — ASSESSMENT & PLAN NOTE
Well-apprearing.  Blood studies reviewed.Colonoscopy is current.  Advised urologic exam and referred Is done.to Dr. Saeid Ayala. She will obtain Shingrix and Tdap but he would like to wait until after his Covid vaccination series. screen for hepatitis C at his next blood draw. Yearly skin exam with Dr. Bundy.

## 2021-02-18 NOTE — ASSESSMENT & PLAN NOTE
Remained stable.  Blood pressure well controlled.  We will continue to follow with Dr. Antonio At [facility].  Echocardiogram stable

## 2021-02-18 NOTE — ASSESSMENT & PLAN NOTE
Possible DJD vs tendonitis - declines x rays   advised ibuprofen and rest - if recurrent will pursue futher

## 2021-04-15 DIAGNOSIS — Z23 ENCOUNTER FOR IMMUNIZATION: ICD-10-CM

## 2022-02-25 RX ORDER — AMLODIPINE BESYLATE 5 MG/1
TABLET ORAL
Qty: 180 TABLET | Refills: 3 | Status: SHIPPED | OUTPATIENT
Start: 2022-02-25 | End: 2022-10-11 | Stop reason: SDUPTHER

## 2022-03-21 RX ORDER — METOPROLOL SUCCINATE 50 MG/1
TABLET, EXTENDED RELEASE ORAL
Qty: 90 TABLET | Refills: 3 | Status: SHIPPED | OUTPATIENT
Start: 2022-03-21 | End: 2022-10-11 | Stop reason: SDUPTHER

## 2022-04-15 ENCOUNTER — TELEPHONE (OUTPATIENT)
Dept: CARDIOLOGY | Facility: CLINIC | Age: 57
End: 2022-04-15
Payer: COMMERCIAL

## 2022-04-15 DIAGNOSIS — Z82.49 FAMILY HISTORY OF THORACIC AORTIC ANEURYSM: ICD-10-CM

## 2022-04-15 DIAGNOSIS — I10 ESSENTIAL HYPERTENSION: ICD-10-CM

## 2022-04-15 DIAGNOSIS — E78.2 MIXED HYPERLIPIDEMIA: Primary | ICD-10-CM

## 2022-04-15 DIAGNOSIS — I71.20 THORACIC AORTIC ANEURYSM WITHOUT RUPTURE (CMS/HCC): ICD-10-CM

## 2022-04-19 ENCOUNTER — TELEPHONE (OUTPATIENT)
Dept: SCHEDULING | Facility: CLINIC | Age: 57
End: 2022-04-19
Payer: COMMERCIAL

## 2022-04-19 DIAGNOSIS — E78.2 MIXED HYPERLIPIDEMIA: Primary | ICD-10-CM

## 2022-04-19 DIAGNOSIS — I71.20 THORACIC AORTIC ANEURYSM WITHOUT RUPTURE (CMS/HCC): ICD-10-CM

## 2022-04-19 DIAGNOSIS — I10 ESSENTIAL HYPERTENSION: ICD-10-CM

## 2022-04-19 DIAGNOSIS — Z82.49 FAMILY HISTORY OF THORACIC AORTIC ANEURYSM: ICD-10-CM

## 2022-04-19 NOTE — TELEPHONE ENCOUNTER
Pt canc his appt for 4/22, due to his children testing positive for COVID.  One child tested positive yesterday and the the other positive today.  Pt tested negative with a home test today but is getting tested tomorrow.  Pt hoping to rescheduled for a Friday, if possible.    Pt also states he received the lab slips but they were for [facility].  Pt requests they be switched to [facility] and be sent to him.    Pt can be reached at [phone].

## 2022-04-19 NOTE — TELEPHONE ENCOUNTER
New script for blood work generated and e-mailed to him.    Please call him and complete the travel screen before making the appt. We need to know his COVID Test results.

## 2022-04-20 NOTE — TELEPHONE ENCOUNTER
Called Pt he will be tested this morning he will call back when he gets his results to make an appointment.

## 2022-04-22 ENCOUNTER — TELEPHONE (OUTPATIENT)
Dept: INTERNAL MEDICINE | Facility: CLINIC | Age: 57
End: 2022-04-22
Payer: COMMERCIAL

## 2022-04-22 NOTE — TELEPHONE ENCOUNTER
Pt tested positive for covid 2 days ago  Pt has immune deficiency -  IGA    Aches; Cough; Fatigue ; low grade fever

## 2022-07-06 ENCOUNTER — OFFICE VISIT (OUTPATIENT)
Dept: INTERNAL MEDICINE | Facility: CLINIC | Age: 57
End: 2022-07-06
Payer: COMMERCIAL

## 2022-07-06 VITALS
HEART RATE: 69 BPM | TEMPERATURE: 98.7 F | WEIGHT: 178 LBS | SYSTOLIC BLOOD PRESSURE: 100 MMHG | BODY MASS INDEX: 24.11 KG/M2 | OXYGEN SATURATION: 98 % | DIASTOLIC BLOOD PRESSURE: 80 MMHG | HEIGHT: 72 IN

## 2022-07-06 DIAGNOSIS — E78.2 MIXED HYPERLIPIDEMIA: ICD-10-CM

## 2022-07-06 DIAGNOSIS — I10 ESSENTIAL HYPERTENSION: ICD-10-CM

## 2022-07-06 DIAGNOSIS — M47.816 LUMBAR SPONDYLOSIS: Primary | ICD-10-CM

## 2022-07-06 DIAGNOSIS — I71.20 THORACIC AORTIC ANEURYSM WITHOUT RUPTURE (CMS/HCC): ICD-10-CM

## 2022-07-06 PROCEDURE — 3008F BODY MASS INDEX DOCD: CPT | Performed by: INTERNAL MEDICINE

## 2022-07-06 PROCEDURE — 3079F DIAST BP 80-89 MM HG: CPT | Performed by: INTERNAL MEDICINE

## 2022-07-06 PROCEDURE — 3074F SYST BP LT 130 MM HG: CPT | Performed by: INTERNAL MEDICINE

## 2022-07-06 PROCEDURE — 99213 OFFICE O/P EST LOW 20 MIN: CPT | Performed by: INTERNAL MEDICINE

## 2022-07-06 RX ORDER — CYCLOBENZAPRINE HCL 10 MG
10 TABLET ORAL 2 TIMES DAILY PRN
Qty: 20 TABLET | Refills: 1 | Status: SHIPPED | OUTPATIENT
Start: 2022-07-06 | End: 2025-02-05 | Stop reason: ALTCHOICE

## 2022-07-06 NOTE — PROGRESS NOTES
Subjective      Patient ID: Jesus Oconnor is a 56 y.o. male.    HPI He has had low back pain for years-that this recently has been getting worse.  Certain movements can precipitate severe low back pain.  Does not radiate down his legs although he has noticed some numbness in his right great toe.  Denies any leg weakness.  No bowel or bladder difficulties.  He denies any history of back injury.  Pain is worse with prolonged sitting or standing.  Riding a bicycle helps.    The following have been reviewed and updated as appropriate in this visit:          Past Medical History:   Diagnosis Date   • Asthma    • Hypertension      Past Surgical History:   Procedure Laterality Date   • ADENOIDECTOMY     • BACK SURGERY       Family History   Problem Relation Age of Onset   • Breast cancer Biological Mother    • Heart disease Biological Father    • Hypertension Biological Father    • Hyperlipidemia Biological Father    • No Known Problems Biological Brother    • Heart disease Mother's Brother    • Heart disease Paternal Grandfather      Social History     Socioeconomic History   • Marital status:      Spouse name: Not on file   • Number of children: Not on file   • Years of education: Not on file   • Highest education level: Not on file   Occupational History   • Not on file   Tobacco Use   • Smoking status: Never Smoker   • Smokeless tobacco: Never Used   Vaping Use   • Vaping Use: Never used   Substance and Sexual Activity   • Alcohol use: Yes     Alcohol/week: 7.0 standard drinks     Types: 7 Standard drinks or equivalent per week   • Drug use: Defer   • Sexual activity: Defer   Other Topics Concern   • Not on file   Social History Narrative   • Not on file     Social Determinants of Health     Financial Resource Strain: Not on file   Food Insecurity: Not on file   Transportation Needs: Not on file   Physical Activity: Not on file   Stress: Not on file   Social Connections: Not on file   Intimate Partner Violence:  Not on file   Housing Stability: Not on file       Review of Systems   Musculoskeletal: Positive for back pain.   Neurological: Positive for numbness. Negative for weakness.       Allergies   Allergen Reactions   • Losartan Potassium      Other reaction(s): bumpy skin     Current Outpatient Medications   Medication Sig Dispense Refill   • cyclobenzaprine (FLEXERIL) 10 mg tablet Take 1 tablet (10 mg total) by mouth 2 (two) times a day as needed for muscle spasms for up to 14 days. 20 tablet 1   • amLODIPine (NORVASC) 5 mg tablet TAKE 1 TABLET BY MOUTH TWICE A  tablet 3   • azelastine (ASTELIN) 137 mcg (0.1 %) nasal spray Administer 1 spray into each nostril 2 (two) times a day. Use in each nostril as directed. 30 mL 5   • beclomethasone dipropionate (QVAR INHL) Inhale as needed.     • cetirizine (ZyrTEC) 10 mg tablet Take 10 mg by mouth as needed.     • metoprolol succinate XL (TOPROL-XL) 50 mg 24 hr tablet TAKE 1 TABLET BY MOUTH EVERY DAY 90 tablet 3   • mometasone (NASONEX) 50 mcg/actuation nasal spray Administer 1 spray into affected nostril(s) daily as needed.     • PROAIR RESPICLICK 90 mcg/actuation inhaler Inhale 2 puffs every 4 (four) hours as needed.  3     No current facility-administered medications for this visit.       Objective   Vitals:    07/06/22 1529   BP: 100/80   Pulse: 69   Temp: 37.1 °C (98.7 °F)   SpO2: 98%   Weight: 80.7 kg (178 lb)   Height: 1.829 m (6')       Physical Exam  Vitals and nursing note reviewed.   Constitutional:       Appearance: He is well-developed.   HENT:      Head: Normocephalic and atraumatic.   Neck:      Thyroid: No thyromegaly.      Vascular: No carotid bruit.   Cardiovascular:      Rate and Rhythm: Normal rate and regular rhythm.      Pulses: Normal pulses.      Heart sounds: Normal heart sounds. No murmur heard.  Pulmonary:      Effort: Pulmonary effort is normal.      Breath sounds: Normal breath sounds.   Abdominal:      General: Bowel sounds are normal.       Palpations: Abdomen is soft. There is no mass.      Tenderness: There is no abdominal tenderness.   Musculoskeletal:      Cervical back: Normal range of motion and neck supple.      Comments: As some of the lumbar paravertebral muscles right greater than left.  Negative straight leg raising bilaterally mild weakness on dorsiflexion of his right great toe, TRS normal   Skin:     General: Skin is warm and dry.   Neurological:      Mental Status: He is alert and oriented to person, place, and time.   Psychiatric:         Behavior: Behavior normal.         Thought Content: Thought content normal.         Judgment: Judgment normal.         Assessment/Plan   Problem List Items Addressed This Visit     Thoracic aortic aneurysm without rupture (CMS/HCC)     Blood pressure is stable.  He follows with Dr. Antonio at [facility] and Dr. Schrader of cardiology           Essential hypertension     Pressure well controlled on amlodipine and metoprolol.           Mixed hyperlipidemia     Continues on heart healthy Mediterranean diet.  Due for repeat lipid profile           Lumbar spondylosis - Primary     His low back pain likely related to worsening lumbar spondylosis.  I reviewed an MRI which was done in 2016.  He did have multilevel degenerative changes.  Has some muscular spasm on exam today.  I would like him to have a physiatry evaluation to come up with a good physical therapy program for strengthening his core and lower back.  Repeat imaging of his lumbar spine as well.  Referred him to Dr. Daina Geller or Dr. Heard.     [facility] at [facility] or Dr. Jus Newton at [facility]   spine.      May use naproxen sodium, cyclobenzaprine 10 mg twice daily and lidocaine patch as needed.  Advised to apply ice rather than heat.  Avoid hyperextension of his lumbar spine.                 Kiah Messina MD    7/7/2022

## 2022-07-06 NOTE — PATIENT INSTRUCTIONS
Follow with Dr. Daina Geller or dr. Orquidea Traylor  [phone] or Dr. Jus morris or Dr. Delfin Childers

## 2022-07-07 ENCOUNTER — TELEPHONE (OUTPATIENT)
Dept: INTERNAL MEDICINE | Facility: CLINIC | Age: 57
End: 2022-07-07

## 2022-07-07 ENCOUNTER — TELEPHONE (OUTPATIENT)
Dept: SCHEDULING | Facility: CLINIC | Age: 57
End: 2022-07-07
Payer: COMMERCIAL

## 2022-07-07 PROBLEM — M47.816 LUMBAR SPONDYLOSIS: Status: ACTIVE | Noted: 2022-07-07

## 2022-07-07 ASSESSMENT — ENCOUNTER SYMPTOMS
NUMBNESS: 1
WEAKNESS: 0
BACK PAIN: 1

## 2022-07-07 NOTE — TELEPHONE ENCOUNTER
Pt called to schedule OV and would like to be seen sooner than next available       Best contact

## 2022-07-07 NOTE — TELEPHONE ENCOUNTER
Please let Mr. Oconnor know that he is due for blood work to check his cholesterol and liver function.  I did not see looking at his chart that he has done this recently

## 2022-07-07 NOTE — ASSESSMENT & PLAN NOTE
His low back pain likely related to worsening lumbar spondylosis.  I reviewed an MRI which was done in 2016.  He did have multilevel degenerative changes.  Has some muscular spasm on exam today.  I would like him to have a physiatry evaluation to come up with a good physical therapy program for strengthening his core and lower back.  Repeat imaging of his lumbar spine as well.  Referred him to Dr. Daina Geller or Dr. Heard.     [facility] at [facility] or Dr. Jus Newton at [facility].      May use naproxen sodium, cyclobenzaprine 10 mg twice daily and lidocaine patch as needed.  Advised to apply ice rather than heat.  Avoid hyperextension of his lumbar spine.

## 2022-07-21 ENCOUNTER — TELEPHONE (OUTPATIENT)
Dept: SCHEDULING | Facility: CLINIC | Age: 57
End: 2022-07-21
Payer: COMMERCIAL

## 2022-07-21 NOTE — TELEPHONE ENCOUNTER
Pt calling to reschedule appt on 8/25/2022 will be out of town. Next avail 10/4/2022 needs sooner.  Pt [phone]

## 2022-09-29 NOTE — PROGRESS NOTES
Cardiology Note    Kiah Hamilton MD          Jesus Oconnor is a 56 y.o. male 1965     He is followed for dilated aortic root follows with [facility] last seen in March 2022 they recommended 2-year follow-up because of his stability aortic root measured 4.1 cm  Last imaging, CAT scan of his aorta February 2022 did show mild arterial sclerosis of the thoracic aorta he genetic testing that was unrevealing   Does have family history with Brother also has dilated aortic root he was evaluated by   No known CAD with coronary calcium score of 0 in 2020   he has a normal aortic valve   he is treated for   hypertension   mixed hyperlipidemia  occ edema from amlodipine      Lab work October 2022  CBC normal creatinine 0.91 potassium 4.6  Cholesterol 179 triglycerides 84 HDL 65 LDL 99                                      Patient Active Problem List    Diagnosis Date Noted    Lumbar spondylosis 07/07/2022    Allergic rhinitis 02/18/2021    Thumb pain 02/18/2021    Physical exam 02/17/2021    Family history of thoracic aortic aneurysm 10/21/2020    Abnormal breathing 01/24/2019    Mixed hyperlipidemia 01/24/2019    Thoracic aortic aneurysm without rupture 11/29/2018    Essential hypertension 11/29/2018       Allergy    Losartan potassium          MED LIST       Current Outpatient Medications   Medication Sig Dispense Refill    amLODIPine (NORVASC) 2.5 mg tablet Take 1 tablet (2.5 mg total) by mouth daily. 90 tablet 1    azelastine (ASTELIN) 137 mcg (0.1 %) nasal spray Administer 1 spray into each nostril 2 (two) times a day. Use in each nostril as directed. 30 mL 5    beclomethasone dipropionate (QVAR INHL) Inhale as needed.      cetirizine (ZyrTEC) 10 mg tablet Take 10 mg by mouth as needed.      cyclobenzaprine (FLEXERIL) 10 mg tablet Take 1 tablet (10 mg total) by mouth 2 (two) times a day as needed for muscle spasms for up to 14 days. 20 tablet 1    metoprolol  succinate XL (TOPROL-XL) 50 mg 24 hr tablet Take 1 tablet (50 mg total) by mouth once daily. 90 tablet 3    mometasone (NASONEX) 50 mcg/actuation nasal spray Administer 1 spray into affected nostril(s) daily as needed.      PROAIR RESPICLICK 90 mcg/actuation inhaler Inhale 2 puffs every 4 (four) hours as needed.  3    rosuvastatin (CRESTOR) 10 mg tablet Take 1 tablet (10 mg total) by mouth daily. 90 tablet 1     No current facility-administered medications for this visit.                Review of Systems       Labs   Lab Results   Component Value Date    WBC 4.9 10/04/2022    HGB 14.4 10/04/2022    HCT 42.0 10/04/2022     10/04/2022    CHOL 179 10/04/2022    TRIG 84 10/04/2022    HDL 65 10/04/2022    LDLCALC 99 10/04/2022    ALT 12 10/04/2022    AST 19 10/04/2022     10/04/2022    K 4.6 10/04/2022     10/04/2022    CREATININE 0.91 10/04/2022    BUN 7 10/04/2022    CO2 24 10/04/2022    TSH 3.17 02/06/2020    HGBA1C 5.0 02/29/2016       Lab Results   Component Value Date    GLUCOSE 88 10/04/2022    CALCIUM 9.8 10/05/2020     10/04/2022    K 4.6 10/04/2022    CO2 24 10/04/2022     10/04/2022    BUN 7 10/04/2022    CREATININE 0.91 10/04/2022         Objective   Vitals:    10/11/22 0906   BP: 124/84   BP Location: Left upper arm   Patient Position: Sitting   Pulse: 60   Resp: 18   SpO2: 99%   Weight: 81.2 kg (179 lb)   Height: 1.829 m (6')     Physical Exam  Constitutional:       General: He is not in acute distress.     Appearance: Normal appearance. He is well-developed. He is not ill-appearing, toxic-appearing or diaphoretic.      Interventions: He is not intubated.  HENT:      Head: Normocephalic. Not microcephalic. No raccoon eyes, abrasion or contusion.      Nose: Nose normal.   Eyes:      General: No scleral icterus.        Left eye: No discharge.      Conjunctiva/sclera:      Right eye: Right conjunctiva is not injected.      Left eye: Left conjunctiva is not injected. No exudate  or hemorrhage.     Pupils: Pupils are equal.   Neck:      Vascular: Normal carotid pulses. No carotid bruit or hepatojugular reflux.   Cardiovascular:      Rate and Rhythm: Normal rate and regular rhythm.      Chest Wall: PMI is not displaced.      Pulses: Normal pulses and intact distal pulses.      Heart sounds: Normal heart sounds. No murmur heard.    No friction rub. No gallop.   Pulmonary:      Effort: Pulmonary effort is normal. No tachypnea, bradypnea or respiratory distress. He is not intubated.      Breath sounds: Normal breath sounds. No stridor. No wheezing or rales.   Chest:      Chest wall: No tenderness.   Abdominal:      General: Bowel sounds are normal. There is no distension.      Palpations: Abdomen is soft.      Tenderness: There is no abdominal tenderness.   Musculoskeletal:         General: No deformity. Normal range of motion.      Cervical back: Normal range of motion and neck supple.   Skin:     Coloration: Skin is not pale.      Findings: No abrasion, bruising, ecchymosis, erythema or rash.   Neurological:      Mental Status: He is alert and oriented to person, place, and time.   Psychiatric:         Mood and Affect: Mood is not anxious or depressed. Affect is not labile, blunt, angry or inappropriate.         Speech: He is communicative. Speech is not slurred.         Behavior: Behavior is not agitated, aggressive, withdrawn or combative.           Cardiac Procedures  Procedures     Cardiovascular Procedures:  ECHO/MUGA:   Echo (Normal EF, ao root 3.9  redundant atrial septum) - 5/20/2016    Echo (aortic root 4.3 nl ef) - oct 2020  aorta 4.21ar index 2  Nl ev av normal  no change        VASCULAR:    CT/MRI:  Cardiac Calcium Score (Total 0:, aortic root 4.0) - 5/12/2015     Chest CT (thoracic aorta 4.3 liver cyst) - 11/2/2015     Chest CT (aortic root 4.4 ( was 4.3 11.15)no change feb 2015, 2mm charley nodule no change 2015) - 12/19/2016     Chest CT (cta 4.2 [facility] repeat 2 years) - aortic  root index 2.0 5/2017    cta 9/19 aorta 4.1 stable    Cor maribel score  Oct 2020  zero aorta 4.5 stable oct 2020  :  cta aorta feb 2022 mild athersclerosis  IMPRESSION:     1.  Stable 4.1 cm aortic root and 4.1 cm ascending aorta.   There is no dissection, intramural hematoma or penetrating atherosclerotic ulcer. Mild atherosclerosis in the aorta      Other   Genetic eval at [facility] 2019 non revealing    OCT 2022 [facility]      Assessment/Plan:      Thoracic aortic aneurysm without rupture (CMS/HCC)  He follows with [facility] last imaged February 2022 CTA 4.1 cm normal aortic valve family history of thoracic aneurysm genetic evaluation and [facility] no significant issues  No obstructive CAD with coronary calcium score of 0 in 2020 there was some arteriosclerotic plaque seen on CTA of aorta February 2022 started statin therapy today    Mixed hyperlipidemia  LDL cholesterol 99 mild plaque seen on thoracic aorta with CT angiogram start rosuvastatin 10 mg goal LDL under 70    Essential hypertension  Intolerant to ARB some puffiness in his skin minimal edema with amlodipine decreased dose to 2.5 continue beta-blocker he knows to watch his blood pressure keep systolic 130 systolic or less        He is followed for   Dilated  aortic root 4.1 cm has a family history of thoracic aneurysm follows [facility]  Last CTA February 2022 4.1 cm genetic testing unrevealing does have family history of this with his brother having a dilated aortic root  CTA did show some arteriosclerotic plaque I started him on statin therapy for baseline LDL cholesterol 99  Blood pressure has been good but he has some intermittent edema will decrease amlodipine to 2.5 he will keep a close eye on his blood pressure remains on this and beta-blocker he was intolerant of ARB with skin reaction  I will see him back in 1 year with echo he is due for CTA and following up in [facility] February  2024            This letter was generated using speech recognition software.  Please excuse any typographical errors.    Thank you for allowing me to participate in the care of this patient.  I hope this information is helpful.    Hubert Schrader MD [facility]   10/11/2022  Cc Kiah Hamilton MD

## 2022-10-05 LAB
ALBUMIN SERPL-MCNC: 4.7 G/DL (ref 3.8–4.9)
ALBUMIN/GLOB SERPL: 1.6 {RATIO} (ref 1.2–2.2)
ALP SERPL-CCNC: 54 IU/L (ref 44–121)
ALT SERPL-CCNC: 12 IU/L (ref 0–44)
AST SERPL-CCNC: 19 IU/L (ref 0–40)
BASOPHILS # BLD AUTO: 0.1 X10E3/UL (ref 0–0.2)
BASOPHILS NFR BLD AUTO: 1 %
BILIRUB SERPL-MCNC: 0.6 MG/DL (ref 0–1.2)
BUN SERPL-MCNC: 7 MG/DL (ref 6–24)
BUN/CREAT SERPL: 8 (ref 9–20)
CALCIUM SERPL-MCNC: 9.5 MG/DL (ref 8.7–10.2)
CHLORIDE SERPL-SCNC: 100 MMOL/L (ref 96–106)
CHOLEST SERPL-MCNC: 179 MG/DL (ref 100–199)
CO2 SERPL-SCNC: 24 MMOL/L (ref 20–29)
CREAT SERPL-MCNC: 0.91 MG/DL (ref 0.76–1.27)
EGFRCR SERPLBLD CKD-EPI 2021: 98 ML/MIN/1.73
EOSINOPHIL # BLD AUTO: 0.4 X10E3/UL (ref 0–0.4)
EOSINOPHIL NFR BLD AUTO: 8 %
ERYTHROCYTE [DISTWIDTH] IN BLOOD BY AUTOMATED COUNT: 12.4 % (ref 11.6–15.4)
GLOBULIN SER CALC-MCNC: 2.9 G/DL (ref 1.5–4.5)
GLUCOSE SERPL-MCNC: 88 MG/DL (ref 70–99)
HCT VFR BLD AUTO: 42 % (ref 37.5–51)
HDLC SERPL-MCNC: 65 MG/DL
HGB BLD-MCNC: 14.4 G/DL (ref 13–17.7)
IMM GRANULOCYTES # BLD AUTO: 0 X10E3/UL (ref 0–0.1)
IMM GRANULOCYTES NFR BLD AUTO: 0 %
LDLC SERPL CALC-MCNC: 99 MG/DL (ref 0–99)
LYMPHOCYTES # BLD AUTO: 1.4 X10E3/UL (ref 0.7–3.1)
LYMPHOCYTES NFR BLD AUTO: 28 %
MCH RBC QN AUTO: 31.4 PG (ref 26.6–33)
MCHC RBC AUTO-ENTMCNC: 34.3 G/DL (ref 31.5–35.7)
MCV RBC AUTO: 92 FL (ref 79–97)
MONOCYTES # BLD AUTO: 0.6 X10E3/UL (ref 0.1–0.9)
MONOCYTES NFR BLD AUTO: 12 %
NEUTROPHILS # BLD AUTO: 2.5 X10E3/UL (ref 1.4–7)
NEUTROPHILS NFR BLD AUTO: 51 %
PLATELET # BLD AUTO: 242 X10E3/UL (ref 150–450)
POTASSIUM SERPL-SCNC: 4.6 MMOL/L (ref 3.5–5.2)
PROT SERPL-MCNC: 7.6 G/DL (ref 6–8.5)
RBC # BLD AUTO: 4.58 X10E6/UL (ref 4.14–5.8)
SODIUM SERPL-SCNC: 137 MMOL/L (ref 134–144)
TRIGL SERPL-MCNC: 84 MG/DL (ref 0–149)
VLDLC SERPL CALC-MCNC: 15 MG/DL (ref 5–40)
WBC # BLD AUTO: 4.9 X10E3/UL (ref 3.4–10.8)

## 2022-10-11 ENCOUNTER — OFFICE VISIT (OUTPATIENT)
Dept: CARDIOLOGY | Facility: CLINIC | Age: 57
End: 2022-10-11
Payer: COMMERCIAL

## 2022-10-11 ENCOUNTER — TELEPHONE (OUTPATIENT)
Dept: CARDIOLOGY | Facility: CLINIC | Age: 57
End: 2022-10-11

## 2022-10-11 VITALS
OXYGEN SATURATION: 99 % | DIASTOLIC BLOOD PRESSURE: 84 MMHG | SYSTOLIC BLOOD PRESSURE: 124 MMHG | RESPIRATION RATE: 18 BRPM | HEIGHT: 72 IN | BODY MASS INDEX: 24.24 KG/M2 | HEART RATE: 60 BPM | WEIGHT: 179 LBS

## 2022-10-11 DIAGNOSIS — I71.20 THORACIC AORTIC ANEURYSM WITHOUT RUPTURE (CMS/HCC): ICD-10-CM

## 2022-10-11 DIAGNOSIS — Z82.49 FAMILY HISTORY OF THORACIC AORTIC ANEURYSM: ICD-10-CM

## 2022-10-11 DIAGNOSIS — E78.2 MIXED HYPERLIPIDEMIA: Primary | ICD-10-CM

## 2022-10-11 PROCEDURE — 3079F DIAST BP 80-89 MM HG: CPT | Performed by: INTERNAL MEDICINE

## 2022-10-11 PROCEDURE — 93000 ELECTROCARDIOGRAM COMPLETE: CPT | Performed by: INTERNAL MEDICINE

## 2022-10-11 PROCEDURE — 99214 OFFICE O/P EST MOD 30 MIN: CPT | Performed by: INTERNAL MEDICINE

## 2022-10-11 PROCEDURE — 3074F SYST BP LT 130 MM HG: CPT | Performed by: INTERNAL MEDICINE

## 2022-10-11 PROCEDURE — 3008F BODY MASS INDEX DOCD: CPT | Performed by: INTERNAL MEDICINE

## 2022-10-11 RX ORDER — AMLODIPINE BESYLATE 5 MG/1
5 TABLET ORAL
Qty: 180 TABLET | Refills: 3 | Status: SHIPPED | OUTPATIENT
Start: 2022-10-11 | End: 2022-10-11

## 2022-10-11 RX ORDER — METOPROLOL SUCCINATE 50 MG/1
50 TABLET, EXTENDED RELEASE ORAL
Qty: 90 TABLET | Refills: 3 | Status: SHIPPED | OUTPATIENT
Start: 2022-10-11 | End: 2023-08-07

## 2022-10-11 RX ORDER — AMLODIPINE BESYLATE 2.5 MG/1
2.5 TABLET ORAL DAILY
Qty: 90 TABLET | Refills: 1 | Status: SHIPPED | OUTPATIENT
Start: 2022-10-11 | End: 2024-07-25

## 2022-10-11 RX ORDER — ROSUVASTATIN CALCIUM 10 MG/1
10 TABLET, COATED ORAL DAILY
Qty: 90 TABLET | Refills: 1 | Status: SHIPPED | OUTPATIENT
Start: 2022-10-11 | End: 2024-07-25

## 2022-10-11 NOTE — ASSESSMENT & PLAN NOTE
He follows with [facility] last imaged February 2022 CTA 4.1 cm normal aortic valve family history of thoracic aneurysm genetic evaluation and [facility] no significant issues  No obstructive CAD with coronary calcium score of 0 in 2020 there was some arteriosclerotic plaque seen on CTA of aorta February 2022 started statin therapy today

## 2022-10-11 NOTE — ASSESSMENT & PLAN NOTE
LDL cholesterol 99 mild plaque seen on thoracic aorta with CT angiogram start rosuvastatin 10 mg goal LDL under 70

## 2022-10-11 NOTE — ASSESSMENT & PLAN NOTE
Intolerant to ARB some puffiness in his skin minimal edema with amlodipine decreased dose to 2.5 continue beta-blocker he knows to watch his blood pressure keep systolic 130 systolic or less

## 2023-06-23 ENCOUNTER — TELEPHONE (OUTPATIENT)
Dept: CARDIOLOGY | Facility: CLINIC | Age: 58
End: 2023-06-23
Payer: COMMERCIAL

## 2023-06-23 NOTE — TELEPHONE ENCOUNTER
----- Message from Jesus Oconnor sent at 6/23/2023  3:42 PM EDT -----  Regarding: a quick question  Contact: [phone]  Dr. Schrader,    Can you tell me if my enlarged aorta diagnosis rules out the use of viagra?    Any guidance would be much appreciated.    Wes Oconnor

## 2023-06-26 NOTE — TELEPHONE ENCOUNTER
Let him know from a cardiac standpoint he is okay to use Viagra it is contraindicated to use with nitroglycerin therapy

## 2023-08-07 RX ORDER — METOPROLOL SUCCINATE 50 MG/1
50 TABLET, EXTENDED RELEASE ORAL DAILY
Qty: 90 TABLET | Refills: 3 | Status: SHIPPED | OUTPATIENT
Start: 2023-08-07 | End: 2024-10-07 | Stop reason: SDUPTHER

## 2023-12-13 ENCOUNTER — CONSULT (OUTPATIENT)
Dept: INTERNAL MEDICINE | Facility: CLINIC | Age: 58
End: 2023-12-13
Payer: COMMERCIAL

## 2023-12-13 VITALS
OXYGEN SATURATION: 99 % | BODY MASS INDEX: 24.92 KG/M2 | WEIGHT: 184 LBS | HEIGHT: 72 IN | SYSTOLIC BLOOD PRESSURE: 126 MMHG | DIASTOLIC BLOOD PRESSURE: 80 MMHG | HEART RATE: 58 BPM | TEMPERATURE: 98.1 F

## 2023-12-13 DIAGNOSIS — Z00.00 ROUTINE MEDICAL EXAM: ICD-10-CM

## 2023-12-13 DIAGNOSIS — Z01.818 PREOP EXAMINATION: Primary | ICD-10-CM

## 2023-12-13 DIAGNOSIS — E78.2 MIXED HYPERLIPIDEMIA: ICD-10-CM

## 2023-12-13 DIAGNOSIS — I10 ESSENTIAL HYPERTENSION: ICD-10-CM

## 2023-12-13 DIAGNOSIS — N52.9 ERECTILE DYSFUNCTION, UNSPECIFIED ERECTILE DYSFUNCTION TYPE: ICD-10-CM

## 2023-12-13 PROCEDURE — 99214 OFFICE O/P EST MOD 30 MIN: CPT | Performed by: NURSE PRACTITIONER

## 2023-12-13 PROCEDURE — 3074F SYST BP LT 130 MM HG: CPT | Performed by: NURSE PRACTITIONER

## 2023-12-13 PROCEDURE — 3008F BODY MASS INDEX DOCD: CPT | Performed by: NURSE PRACTITIONER

## 2023-12-13 PROCEDURE — 93000 ELECTROCARDIOGRAM COMPLETE: CPT | Performed by: NURSE PRACTITIONER

## 2023-12-13 PROCEDURE — 3079F DIAST BP 80-89 MM HG: CPT | Performed by: NURSE PRACTITIONER

## 2023-12-13 ASSESSMENT — ENCOUNTER SYMPTOMS
EYE REDNESS: 0
FREQUENCY: 0
EYE ITCHING: 0
DYSURIA: 0
STRIDOR: 0
ANAL BLEEDING: 0
SEIZURES: 0
SPEECH DIFFICULTY: 0
UNEXPECTED WEIGHT CHANGE: 0
VOMITING: 0
APNEA: 0
DIARRHEA: 0
NERVOUS/ANXIOUS: 0
SORE THROAT: 0
SINUS PRESSURE: 0
SLEEP DISTURBANCE: 0
HEADACHES: 0
POLYDIPSIA: 0
DYSPHORIC MOOD: 0
ADENOPATHY: 0
ABDOMINAL PAIN: 0
APPETITE CHANGE: 0
FACIAL ASYMMETRY: 0
LIGHT-HEADEDNESS: 0
WEAKNESS: 0
CHOKING: 0
BACK PAIN: 0
DIFFICULTY URINATING: 0
FATIGUE: 0
RECTAL PAIN: 0
WOUND: 0
TREMORS: 0
MYALGIAS: 0
BLOOD IN STOOL: 0
FEVER: 0
WHEEZING: 0
EYE PAIN: 0
RHINORRHEA: 0
SHORTNESS OF BREATH: 0
TROUBLE SWALLOWING: 0
NUMBNESS: 0
FLANK PAIN: 0
NECK STIFFNESS: 0
POLYPHAGIA: 0
JOINT SWELLING: 0
NAUSEA: 0
ACTIVITY CHANGE: 0
PHOTOPHOBIA: 0
BRUISES/BLEEDS EASILY: 0
DIAPHORESIS: 0
ARTHRALGIAS: 0
HEMATURIA: 0
FACIAL SWELLING: 0
NECK PAIN: 0
COUGH: 0
SINUS PAIN: 0
CHILLS: 0
COLOR CHANGE: 0
EYE DISCHARGE: 0
DIZZINESS: 0
ABDOMINAL DISTENTION: 0
PALPITATIONS: 0
DECREASED CONCENTRATION: 0
CONSTIPATION: 0
CHEST TIGHTNESS: 0

## 2023-12-13 NOTE — PROGRESS NOTES
[facility]  Irena Carrera  [address] [place] [address]  Phone: [phone]      Visit Date: 12/13/2023    Patient ID: Jesus Oconnor is a 58 y.o. male.  Surgeon: Dr. Joshi  Facility: [facility] in [place]  Fax: [phone]  Date of surgery: 1/11/24 and 1/18/24    Procedure: Left eye cataract surgery followed by right eye cataract.     Chief Complaint: Pre-op Exam (Lt Cataract surgery:1/11/2024)        Pt states he has had blurry vision that has come on gradually . He was told about a year ago to follow up in a year and his eyes are ready. He would like to have a RX for Viagra. He has not had meds in the past for ED.       Patient Active Problem List   Diagnosis   • Thoracic aortic aneurysm without rupture (CMS/HCC)   • Essential hypertension   • Abnormal breathing   • Mixed hyperlipidemia   • Family history of thoracic aortic aneurysm   • Physical exam   • Allergic rhinitis   • Thumb pain   • Lumbar spondylosis   • Preop examination   • Erectile dysfunction          Current Outpatient Medications:   •  amLODIPine (NORVASC) 2.5 mg tablet, Take 1 tablet (2.5 mg total) by mouth daily. (Patient taking differently: Take 5 mg by mouth daily.), Disp: 90 tablet, Rfl: 1  •  beclomethasone dipropionate (QVAR INHL), Inhale as needed., Disp: , Rfl:   •  cetirizine (ZyrTEC) 10 mg tablet, Take 10 mg by mouth as needed., Disp: , Rfl:   •  metoprolol succinate XL (TOPROL-XL) 50 mg 24 hr tablet, TAKE 1 TABLET BY MOUTH EVERY DAY, Disp: 90 tablet, Rfl: 3  •  mometasone (NASONEX) 50 mcg/actuation nasal spray, Administer 1 spray into affected nostril(s) daily as needed., Disp: , Rfl:   •  PROAIR RESPICLICK 90 mcg/actuation inhaler, Inhale 2 puffs every 4 (four) hours as needed., Disp: , Rfl: 3  •  cyclobenzaprine (FLEXERIL) 10 mg tablet, Take 1 tablet (10 mg total) by mouth 2 (two) times a day as needed for muscle spasms for up to 14 days., Disp: 20 tablet,  Rfl: 1  •  rosuvastatin (CRESTOR) 10 mg tablet, Take 1 tablet (10 mg total) by mouth daily., Disp: 90 tablet, Rfl: 1    Allergies   Allergen Reactions   • Losartan Potassium      Other reaction(s): bumpy skin       Past Medical History:   Diagnosis Date   • Asthma    • Cataract cortical, senile    • Hypertension        Past Surgical History:   Procedure Laterality Date   • ADENOIDECTOMY     • BACK SURGERY         Social History     Tobacco Use   • Smoking status: Never   • Smokeless tobacco: Never   Vaping Use   • Vaping Use: Never used   Substance Use Topics   • Alcohol use: Yes     Alcohol/week: 14.0 standard drinks of alcohol     Types: 14 Glasses of wine per week   • Drug use: Never       Family History   Problem Relation Age of Onset   • Breast cancer Biological Mother    • Heart disease Biological Father    • Hypertension Biological Father    • Hyperlipidemia Biological Father    • No Known Problems Biological Brother    • Tuberculosis Maternal Grandmother    • No Known Problems Maternal Grandfather    • No Known Problems Paternal Grandmother    • Heart disease Paternal Grandfather    • Heart attack Father's Brother         Review of Systems   Constitutional: Negative for activity change, appetite change, chills, diaphoresis, fatigue, fever and unexpected weight change.   HENT: Negative for congestion, dental problem, drooling, ear discharge, ear pain, facial swelling, hearing loss, mouth sores, nosebleeds, postnasal drip, rhinorrhea, sinus pressure, sinus pain, sneezing, sore throat, tinnitus and trouble swallowing.    Eyes: Positive for visual disturbance (cloudiness). Negative for photophobia, pain, discharge, redness and itching.   Respiratory: Negative for apnea, cough, choking, chest tightness, shortness of breath, wheezing and stridor.    Cardiovascular: Negative for chest pain, palpitations and leg swelling.   Gastrointestinal: Negative for abdominal distention, abdominal pain, anal bleeding, blood in  stool, constipation, diarrhea, nausea, rectal pain and vomiting.   Endocrine: Negative for cold intolerance, heat intolerance, polydipsia, polyphagia and polyuria.   Genitourinary: Negative for decreased urine volume, difficulty urinating, dysuria, enuresis, flank pain, frequency, genital sores, hematuria, penile discharge, penile pain, penile swelling, scrotal swelling, testicular pain and urgency.   Musculoskeletal: Negative for arthralgias, back pain, gait problem, joint swelling, myalgias, neck pain and neck stiffness.        Right > left shoulder pain due to tendonitis - swimming.   Skin: Negative for color change, pallor, rash and wound.   Allergic/Immunologic: Negative for environmental allergies, food allergies and immunocompromised state.   Neurological: Negative for dizziness, tremors, seizures, syncope, facial asymmetry, speech difficulty, weakness, light-headedness, numbness and headaches.   Hematological: Negative for adenopathy. Does not bruise/bleed easily.   Psychiatric/Behavioral: Negative for decreased concentration, dysphoric mood and sleep disturbance. The patient is not nervous/anxious.        Vitals:    12/13/23 0925   BP: 126/80   BP Location: Left upper arm   Patient Position: Sitting   Pulse: (!) 58   Temp: 36.7 °C (98.1 °F)   SpO2: 99%   Weight: 83.5 kg (184 lb)   Height: 1.829 m (6')       Body mass index is 24.95 kg/m².      Physical Exam  Vitals reviewed.   Constitutional:       Appearance: Normal appearance.   HENT:      Right Ear: Tympanic membrane and ear canal normal.      Left Ear: Ear canal normal.      Mouth/Throat:      Mouth: Mucous membranes are moist.      Pharynx: No oropharyngeal exudate or posterior oropharyngeal erythema.   Eyes:      Conjunctiva/sclera: Conjunctivae normal.   Cardiovascular:      Rate and Rhythm: Normal rate and regular rhythm.      Heart sounds: Normal heart sounds.   Pulmonary:      Effort: Pulmonary effort is normal.      Breath sounds: Normal breath  sounds.   Abdominal:      General: Bowel sounds are normal. There is no distension.      Palpations: Abdomen is soft.      Tenderness: There is no abdominal tenderness.   Musculoskeletal:         General: Normal range of motion.      Cervical back: Neck supple.   Skin:     General: Skin is warm and dry.   Neurological:      Mental Status: He is alert.      Sensory: No sensory deficit.      Motor: No weakness.      Gait: Gait normal.   Psychiatric:         Mood and Affect: Mood normal.         Behavior: Behavior normal.            Assessment/Plan     Problem List Items Addressed This Visit        Circulatory    Essential hypertension     Patient's blood pressure is stable on his current medication.         Relevant Orders    Comprehensive metabolic panel    CBC and differential    Lipid panel    TSH w reflex FT4       Genitourinary    Erectile dysfunction     Patient is complaining today of erectile dysfunction.  He is requesting a prescription for Viagra.  He has never had Viagra in the past.  We discussed that he should have his PSA and a testosterone level completed prior to starting medications and he is in agreement.  He was provided a lab slip today.         Relevant Orders    Testosterone, total and free       Other    Mixed hyperlipidemia     Patient is due for a lipid panel.  Lab slips were provided.         Relevant Orders    Comprehensive metabolic panel    CBC and differential    Lipid panel    TSH w reflex FT4    Preop examination - Primary     Is a 58-year-old male who presents today for medical clearance.  He does see cardiology for dilated aortic root follows with [facility] last seen in March 2022 they recommended 2-year follow-up because of his stability aortic root measured 4.1 cm as well as Dr. Schrader locally.  He is due back to see Dr. Schrader.  His EKG today was unchanged from his EKG 10/11/2022  Sinus bradycardia and an RSR in V1 which is nondiagnostic..  Patient states that he  does swim for exercise but has been on hold because of shoulder pain.  He does state that he is not short of breath with a flight of stairs.  Patient's functional capacity is greater than 4 METS.  He is cleared for his upcoming cataract surgery.         Relevant Orders    ECG 12 LEAD OFFICE PERFORMED (Completed)   Other Visit Diagnoses     Routine medical exam        Relevant Orders    PSA            RODNEY Lee CRNP  12/13/2023

## 2023-12-13 NOTE — ASSESSMENT & PLAN NOTE
Patient is complaining today of erectile dysfunction.  He is requesting a prescription for Viagra.  He has never had Viagra in the past.  We discussed that he should have his PSA and a testosterone level completed prior to starting medications and he is in agreement.  He was provided a lab slip today.

## 2023-12-13 NOTE — ASSESSMENT & PLAN NOTE
Is a 58-year-old male who presents today for medical clearance.  He does see cardiology for dilated aortic root follows with [facility] last seen in March 2022 they recommended 2-year follow-up because of his stability aortic root measured 4.1 cm as well as Dr. Schrader locally.  He is due back to see Dr. Schrader.  His EKG today was unchanged from his EKG 10/11/2022  Sinus bradycardia and an RSR in V1 which is nondiagnostic..  Patient states that he does swim for exercise but has been on hold because of shoulder pain.  He does state that he is not short of breath with a flight of stairs.  Patient's functional capacity is greater than 4 METS.  He is cleared for his upcoming cataract surgery.

## 2023-12-21 LAB
BASOPHILS # BLD AUTO: 0.1 X10E3/UL (ref 0–0.2)
BASOPHILS NFR BLD AUTO: 1 %
EOSINOPHIL # BLD AUTO: 0.3 X10E3/UL (ref 0–0.4)
EOSINOPHIL NFR BLD AUTO: 4 %
ERYTHROCYTE [DISTWIDTH] IN BLOOD BY AUTOMATED COUNT: 12.5 % (ref 11.6–15.4)
HCT VFR BLD AUTO: 42.6 % (ref 37.5–51)
HGB BLD-MCNC: 15 G/DL (ref 13–17.7)
IMM GRANULOCYTES # BLD AUTO: 0 X10E3/UL (ref 0–0.1)
IMM GRANULOCYTES NFR BLD AUTO: 0 %
LYMPHOCYTES # BLD AUTO: 1.5 X10E3/UL (ref 0.7–3.1)
LYMPHOCYTES NFR BLD AUTO: 27 %
MCH RBC QN AUTO: 32.3 PG (ref 26.6–33)
MCHC RBC AUTO-ENTMCNC: 35.2 G/DL (ref 31.5–35.7)
MCV RBC AUTO: 92 FL (ref 79–97)
MONOCYTES # BLD AUTO: 0.7 X10E3/UL (ref 0.1–0.9)
MONOCYTES NFR BLD AUTO: 13 %
NEUTROPHILS # BLD AUTO: 3.1 X10E3/UL (ref 1.4–7)
NEUTROPHILS NFR BLD AUTO: 55 %
PLATELET # BLD AUTO: 234 X10E3/UL (ref 150–450)
RBC # BLD AUTO: 4.64 X10E6/UL (ref 4.14–5.8)
WBC # BLD AUTO: 5.6 X10E3/UL (ref 3.4–10.8)

## 2023-12-22 LAB
ALBUMIN SERPL-MCNC: 4.6 G/DL (ref 3.8–4.9)
ALBUMIN/GLOB SERPL: 1.4 {RATIO} (ref 1.2–2.2)
ALP SERPL-CCNC: 56 IU/L (ref 44–121)
ALT SERPL-CCNC: 13 IU/L (ref 0–44)
AST SERPL-CCNC: 21 IU/L (ref 0–40)
BILIRUB SERPL-MCNC: 0.6 MG/DL (ref 0–1.2)
BUN SERPL-MCNC: 8 MG/DL (ref 6–24)
BUN/CREAT SERPL: 9 (ref 9–20)
CALCIUM SERPL-MCNC: 9.6 MG/DL (ref 8.7–10.2)
CHLORIDE SERPL-SCNC: 100 MMOL/L (ref 96–106)
CHOLEST SERPL-MCNC: 173 MG/DL (ref 100–199)
CO2 SERPL-SCNC: 24 MMOL/L (ref 20–29)
CREAT SERPL-MCNC: 0.91 MG/DL (ref 0.76–1.27)
EGFRCR SERPLBLD CKD-EPI 2021: 97 ML/MIN/1.73
GLOBULIN SER CALC-MCNC: 3.2 G/DL (ref 1.5–4.5)
GLUCOSE SERPL-MCNC: 91 MG/DL (ref 70–99)
HDLC SERPL-MCNC: 64 MG/DL
LDLC SERPL CALC-MCNC: 99 MG/DL (ref 0–99)
POTASSIUM SERPL-SCNC: 4.7 MMOL/L (ref 3.5–5.2)
PROT SERPL-MCNC: 7.8 G/DL (ref 6–8.5)
PSA SERPL-MCNC: 0.2 NG/ML (ref 0–4)
SODIUM SERPL-SCNC: 137 MMOL/L (ref 134–144)
T4 FREE SERPL-MCNC: 1.23 NG/DL (ref 0.82–1.77)
TESTOST FREE SERPL-MCNC: 8.4 PG/ML (ref 7.2–24)
TESTOST SERPL-MCNC: 674 NG/DL (ref 264–916)
TRIGL SERPL-MCNC: 51 MG/DL (ref 0–149)
TSH SERPL DL<=0.005 MIU/L-ACNC: 3.57 UIU/ML (ref 0.45–4.5)
VLDLC SERPL CALC-MCNC: 10 MG/DL (ref 5–40)

## 2023-12-27 ENCOUNTER — TELEPHONE (OUTPATIENT)
Dept: INTERNAL MEDICINE | Facility: CLINIC | Age: 58
End: 2023-12-27
Payer: COMMERCIAL

## 2023-12-27 NOTE — TELEPHONE ENCOUNTER
Please call the patient and let him know that all his labs were within normal range.  Testosterone and PSA were within normal limits.  He did want medication for ED.  Does he know if he wanted Cialis or Viagra?

## 2024-01-08 RX ORDER — SILDENAFIL 50 MG/1
50 TABLET, FILM COATED ORAL DAILY PRN
Qty: 10 TABLET | Refills: 0 | Status: SHIPPED | OUTPATIENT
Start: 2024-01-08 | End: 2024-04-09

## 2024-01-08 RX ORDER — SILDENAFIL 50 MG/1
50 TABLET, FILM COATED ORAL DAILY PRN
Qty: 10 TABLET | Refills: 0 | Status: SHIPPED | OUTPATIENT
Start: 2024-01-08 | End: 2024-01-08

## 2024-01-25 ENCOUNTER — TELEPHONE (OUTPATIENT)
Dept: SCHEDULING | Facility: CLINIC | Age: 59
End: 2024-01-25

## 2024-01-29 DIAGNOSIS — I10 ESSENTIAL HYPERTENSION: ICD-10-CM

## 2024-01-29 DIAGNOSIS — I71.21 ANEURYSM OF ASCENDING AORTA WITHOUT RUPTURE (CMS/HCC): Primary | ICD-10-CM

## 2024-01-29 DIAGNOSIS — E78.2 MIXED HYPERLIPIDEMIA: ICD-10-CM

## 2024-01-29 NOTE — PROGRESS NOTES
Cardiology Note    Odilia Early MD          Jessu Oconnor is a 58 y.o. male 1965       He was last seen October 2022  He has dilated aortic root  No known obstructive CAD mixed hyperlipidemia and hypertension    He returns for follow-up and echocardiogram  He follows at [facility].  Aortic root due for CTA February 2024 last imaged by CTA February 2022 at that time it measured at 4.1 cm at his last visit with Dr. Antonio he was told imaging every 2 years he has a family history of thoracic aneurysm with his nephew  No known obstructive CAD with coronary calcium score of 0 2020 mild plaque seen in thoracic aorta at that time I started him on statin therapy in the past  Structurally normal aortic valve  Echocardiogram today February 2024 showed  Aortic root stable 4.1 cm unchanged from 2018  Reviewing his lab work cholesterol not at goal for arteriosclerosis present in the thoracic aortic with previous CTA arteriosclerotic ulcer was seen with imaging February 2022  Reviewing lab work cholesterol not quite at goal LDL 99 he is not taking his rosuvastatin  He promises to re start it      Lab work December 2023  Cholesterol 173 triglycerides 51 HDL 64 LDL 99  Creatinine 0.91 potassium 4.7  CBC normal                                                Patient Active Problem List    Diagnosis Date Noted   • PVD (peripheral vascular disease) (CMS/[facility]) 02/07/2024   • Erectile dysfunction 12/13/2023   • Lumbar spondylosis 07/07/2022   • Allergic rhinitis 02/18/2021   • Family history of thoracic aortic aneurysm 10/21/2020   • Mixed hyperlipidemia 01/24/2019   • Thoracic aortic aneurysm without rupture (CMS/[facility]) 11/29/2018   • Essential hypertension 11/29/2018       Allergy    Losartan potassium          MED LIST       Current Outpatient Medications   Medication Sig Dispense Refill   • amLODIPine (NORVASC) 2.5 mg tablet Take 1 tablet (2.5 mg total) by mouth daily. (Patient taking differently: Take 5  mg by mouth daily.) 90 tablet 1   • beclomethasone dipropionate (QVAR INHL) Inhale as needed.     • cetirizine (ZyrTEC) 10 mg tablet Take 10 mg by mouth as needed.     • cyclobenzaprine (FLEXERIL) 10 mg tablet Take 1 tablet (10 mg total) by mouth 2 (two) times a day as needed for muscle spasms for up to 14 days. 20 tablet 1   • difluprednate (DUREZOL) 0.05 % drops Administer 1 drop into both eyes daily.     • lidocaine (LIDODERM) 5 % patch Place 1 patch on the skin as needed.     • metoprolol succinate XL (TOPROL-XL) 50 mg 24 hr tablet TAKE 1 TABLET BY MOUTH EVERY DAY 90 tablet 3   • mometasone (NASONEX) 50 mcg/actuation nasal spray Administer 1 spray into affected nostril(s) daily as needed.     • PROAIR RESPICLICK 90 mcg/actuation inhaler Inhale 2 puffs every 4 (four) hours as needed.  3   • rosuvastatin (CRESTOR) 10 mg tablet Take 1 tablet (10 mg total) by mouth daily. 90 tablet 1   • sildenafiL (VIAGRA) 50 mg tablet Take 1 tablet (50 mg total) by mouth daily as needed for erectile dysfunction. 10 tablet 0   • rosuvastatin (CRESTOR) 10 mg tablet Take 1 tablet (10 mg total) by mouth daily. (Patient not taking: Reported on 2/7/2024) 90 tablet 1     No current facility-administered medications for this visit.                ROS    Labs   Lab Results   Component Value Date    WBC 5.6 12/21/2023    HGB 15.0 12/21/2023    HCT 42.6 12/21/2023     12/21/2023    CHOL 173 12/21/2023    TRIG 51 12/21/2023    HDL 64 12/21/2023    LDLCALC 99 12/21/2023    ALT 13 12/21/2023    AST 21 12/21/2023     12/21/2023    K 4.7 12/21/2023     12/21/2023    CREATININE 0.91 12/21/2023    BUN 8 12/21/2023    CO2 24 12/21/2023    TSH 3.570 12/21/2023    HGBA1C 5.0 02/29/2016       Lab Results   Component Value Date    GLUCOSE 91 12/21/2023    CALCIUM 9.8 10/05/2020     12/21/2023    K 4.7 12/21/2023    CO2 24 12/21/2023     12/21/2023    BUN 8 12/21/2023    CREATININE 0.91 12/21/2023         Objective    Vitals:    02/07/24 0925   BP: 122/80   BP Location: Left upper arm   Patient Position: Sitting   Pulse: 61   Resp: 18   SpO2: 99%   Weight: 84.4 kg (186 lb)   Height: 1.829 m (6')     Physical Exam  Constitutional:       General: He is not in acute distress.     Appearance: Normal appearance. He is well-developed. He is not ill-appearing, toxic-appearing or diaphoretic.      Interventions: He is not intubated.  HENT:      Head: Normocephalic. Not microcephalic. No raccoon eyes, abrasion or contusion.      Nose: Nose normal.   Eyes:      General: No scleral icterus.        Left eye: No discharge.      Conjunctiva/sclera:      Right eye: Right conjunctiva is not injected.      Left eye: Left conjunctiva is not injected. No exudate or hemorrhage.     Pupils: Pupils are equal.   Neck:      Vascular: Normal carotid pulses. No carotid bruit or hepatojugular reflux.   Cardiovascular:      Rate and Rhythm: Normal rate and regular rhythm.      Chest Wall: PMI is not displaced.      Pulses: Normal pulses and intact distal pulses.      Heart sounds: Normal heart sounds. No murmur heard.     No friction rub. No gallop.   Pulmonary:      Effort: Pulmonary effort is normal. No tachypnea, bradypnea or respiratory distress. He is not intubated.      Breath sounds: Normal breath sounds. No stridor. No wheezing or rales.   Chest:      Chest wall: No tenderness.   Abdominal:      General: Bowel sounds are normal. There is no distension.      Palpations: Abdomen is soft.      Tenderness: There is no abdominal tenderness.   Musculoskeletal:         General: No deformity. Normal range of motion.      Cervical back: Normal range of motion and neck supple.   Skin:     Coloration: Skin is not pale.      Findings: No abrasion, bruising, ecchymosis, erythema or rash.   Neurological:      Mental Status: He is alert and oriented to person, place, and time.   Psychiatric:         Mood and Affect: Mood is not anxious or depressed. Affect is  not labile, blunt, angry or inappropriate.         Speech: He is communicative. Speech is not slurred.         Behavior: Behavior is not agitated, aggressive, withdrawn or combative.           Cardiac Procedures  Procedures      ECHO/MUGA:   Echo (Normal EF, ao root 3.9  redundant atrial septum) - 5/20/2016    Echo (aortic root 4.3 nl ef) - oct 2020  aorta 4.21ar index 2  Nl ev av normal  no change    Echo feb 2024    Left Ventricle: Normal ventricle size. Normal wall thickness. Preserved systolic function. Estimated EF 60%. No regional wall motion abnormalities. Normal diastolic filling pattern for age.  •  Aorta: Aortic root normal. Sinuses of Valsalva normal-sized. Ascending aorta normal-sized. Aortic arch normal-sized. Descending aorta normal-sized. Mild dilatation of the aortic root. Mild dilatation of the ascending aorta.  4.1 cm unchanged from 2018  •  Right Ventricle: Normal ventricle size. Normal systolic function.  •  Left Atrium: Normal sized atrium.  •  Right Atrium: Normal sized atrium.  •  Pulmonic Valve: Normal structure. No regurgitation. No stenosis.  •  Tricuspid Valve: Normal structure. Trace regurgitation. Estimated RVSP = 21 mmHg. No significant stenosis.  •  Mitral Valve: Normal leaflet structure. Normal leaflet motion. Trace regurgitation. No stenosis.  •  Aortic Valve: Tricuspid valve. No regurgitation. No stenosis. Calculated dimensionless index = 0.89.          VASCULAR:    CT/MRI:  Cardiac Calcium Score (Total 0:, aortic root 4.0) - 5/12/2015     Chest CT (thoracic aorta 4.3 liver cyst) - 11/2/2015     Chest CT (aortic root 4.4 ( was 4.3 11.15)no change feb 2015, 2mm charley nodule no change 2015) - 12/19/2016     Chest CT (cta 4.2 [place] repeat 2 years) - aortic root index 2.0 5/2017    cta 9/19 aorta 4.1 stable    Cor maribel score  Oct 2020  zero aorta 4.5 stable oct 2020  :  cta aorta feb 2022 mild athersclerosis  IMPRESSION:     1.  Stable 4.1 cm aortic root and 4.1 cm ascending aorta.    There is no dissection, intramural hematoma or penetrating atherosclerotic ulcer. Mild atherosclerosis in the aorta    CTA March 2024 thoracic aorta 4.2 cm no significant change from 2015  Patulous esophagus  Renal cysts hepatic cyst  Gynecomastia  Other   Genetic eval at [facility] 2019 non revealing          February 2024, normal              Assessment/Plan:      Thoracic aortic aneurysm without rupture (CMS/HCC)  horacic aortic aneurysm with plaque and ulceration follows at [facility]  Last CTA was 2022 stable in size from 2018 was 4.4 by CTA echo today February 2024 stable 4.1 cm  With the nephew having thoracic aneurysm  His genetic testing was unremarkable    He has no evidence of obstructive CAD with coronary calcium score of 0 in 2020  I started him on statin therapy with arterioscleet of his thoracic aorta, he promises to start it  Is baseline LDL cholesterol 99rosis       I gave him a slip to have CTA of his thoracic aorta  And he will follow-up at [facility]  He will follow up with us in 1 year with echocardiogram    Mixed hyperlipidemia  LDL cholesterol 99 he has a diagnosis of arteriosclerotic plaque on his thoracic aorta seen on CT in 2022 promises to start the medication he will call in a few months goal is LDL under 70    Essential hypertension  Skin reaction to ARB blood pressure is controlled with amlodipine and beta-blocker goal is consistently to be under 130 systolic        He is followed for   Dilated  aortic root 4.1 cm has a family history of thoracic aneurysm follows [facility]  Last CTA February 2022 4.1 cm genetic testing unrevealing does have family history of this with his brother having a dilated aortic root  CTA did show some arteriosclerotic plaque I started him on statin therapy for baseline LDL cholesterol 99  But he did not take any promises to start medication at  Blood pressure is well-controlled on calcium channel blocker  beta-blocker  He is getting CTA of his thoracic aorta and follow-up with aortic clinic at [facility]  He will have lab work in a few months after the start rosuvastatin currently for the results  Follow-up 1 year with cardiology, and and echocardiogram            This letter was generated using speech recognition software.  Please excuse any typographical errors.    Thank you for allowing me to participate in the care of this patient.  I hope this information is helpful.    Hubert Schrader MD [facility]   2/7/2024  Odilia Reilly MD

## 2024-02-07 ENCOUNTER — HOSPITAL ENCOUNTER (OUTPATIENT)
Dept: CARDIOLOGY | Facility: CLINIC | Age: 59
Discharge: HOME | End: 2024-02-07
Attending: INTERNAL MEDICINE
Payer: COMMERCIAL

## 2024-02-07 ENCOUNTER — OFFICE VISIT (OUTPATIENT)
Dept: CARDIOLOGY | Facility: CLINIC | Age: 59
End: 2024-02-07
Payer: COMMERCIAL

## 2024-02-07 VITALS
HEIGHT: 72 IN | WEIGHT: 186 LBS | DIASTOLIC BLOOD PRESSURE: 80 MMHG | RESPIRATION RATE: 18 BRPM | BODY MASS INDEX: 25.19 KG/M2 | HEART RATE: 61 BPM | SYSTOLIC BLOOD PRESSURE: 122 MMHG | OXYGEN SATURATION: 99 %

## 2024-02-07 VITALS
SYSTOLIC BLOOD PRESSURE: 128 MMHG | HEIGHT: 72 IN | WEIGHT: 186 LBS | BODY MASS INDEX: 25.19 KG/M2 | DIASTOLIC BLOOD PRESSURE: 80 MMHG

## 2024-02-07 DIAGNOSIS — E78.2 MIXED HYPERLIPIDEMIA: ICD-10-CM

## 2024-02-07 DIAGNOSIS — I10 ESSENTIAL HYPERTENSION: ICD-10-CM

## 2024-02-07 DIAGNOSIS — I73.9 PVD (PERIPHERAL VASCULAR DISEASE) (CMS/HCC): ICD-10-CM

## 2024-02-07 DIAGNOSIS — I71.20 THORACIC AORTIC ANEURYSM WITHOUT RUPTURE, UNSPECIFIED PART (CMS/HCC): Primary | ICD-10-CM

## 2024-02-07 DIAGNOSIS — I71.21 ANEURYSM OF ASCENDING AORTA WITHOUT RUPTURE (CMS/HCC): ICD-10-CM

## 2024-02-07 PROBLEM — Z01.818 PREOP EXAMINATION: Status: RESOLVED | Noted: 2023-12-13 | Resolved: 2024-02-07

## 2024-02-07 PROBLEM — R06.9 ABNORMAL BREATHING: Status: RESOLVED | Noted: 2019-01-24 | Resolved: 2024-02-07

## 2024-02-07 PROBLEM — Z00.00 PHYSICAL EXAM: Status: RESOLVED | Noted: 2021-02-17 | Resolved: 2024-02-07

## 2024-02-07 PROBLEM — M79.646 THUMB PAIN: Status: RESOLVED | Noted: 2021-02-18 | Resolved: 2024-02-07

## 2024-02-07 LAB
AORTIC ROOT ANNULUS: 4.1 CM
AORTIC VALVE MEAN VELOCITY: 0.78 M/S
AORTIC VALVE VELOCITY TIME INTEGRAL: 22.9 CM
ASCENDING AORTA: 4 CM
AV MEAN GRADIENT: 3 MMHG
AV MEAN GRADIENT: 3 MMHG
AV PEAK GRADIENT: 6 MMHG
AV PEAK VELOCITY-S: 1.22 M/S
AV VALVE AREA INDEX: 1.94
AV VALVE AREA: 2.91 CM2
AV VELOCITY RATIO: 0.89
AVA (VTI): 4.01 CM2
BSA FOR ECHO PROCEDURE: 2.07 M2
CUSP SEPARATION: 2.7 CM
DOP CALC LVOT STROKE VOLUME: 91.79 CM3
E WAVE DECELERATION TIME: 395 MS
E/A RATIO: 1
E/E' RATIO: 6.1
E/LAT E' RATIO: 4.1
EDV (BP): 69.7 CM3
EF (A4C): 55.1 %
EF A2C: 62.4 %
EJECTION FRACTION: 57.1 %
EST RIGHT VENT SYSTOLIC PRESSURE BY TRICUSPID REGURGITATION JET: 21 MMHG
ESV (BP): 29.9 CM3
FRACTIONAL SHORTENING: 45.31 %
INTERVENTRICULAR SEPTUM: 0.84 CM
LA ESV (BP): 42.3 CM3
LA ESV INDEX (A2C): 28.21 CM3/M2
LA ESV INDEX (BP): 20.43 CM3/M2
LA/AORTA RATIO: 0.85
LAAS-AP2: 18.7 CM2
LAAS-AP4: 13.8 CM2
LAD 2D: 3.5 CM
LALD A4C: 4.8 CM
LALD A4C: 4.88 CM
LAV-S: 58.4 CM3
LEFT ATRIUM VOLUME INDEX: 14.59 CM3/M2
LEFT ATRIUM VOLUME: 30.2 CM3
LEFT INTERNAL DIMENSION IN SYSTOLE: 3.03 CM (ref 3.13–4.74)
LEFT VENTRICLE DIASTOLIC VOLUME INDEX: 34.11 CM3/M2
LEFT VENTRICLE DIASTOLIC VOLUME: 70.6 CM3
LEFT VENTRICLE SYSTOLIC VOLUME INDEX: 15.27 CM3/M2
LEFT VENTRICLE SYSTOLIC VOLUME: 31.6 CM3
LEFT VENTRICULAR INTERNAL DIMENSION IN DIASTOLE: 5.54 CM (ref 5.34–7.42)
LEFT VENTRICULAR POSTERIOR WALL IN END DIASTOLE: 0.77 CM (ref 0.68–1.27)
LV DIASTOLIC VOLUME: 65.9 CM3
LV ESV (APICAL 2 CHAMBER): 24.9 CM3
LVAD-AP2: 25.8 CM2
LVAD-AP4: 26.4 CM2
LVAS-AP2: 13.4 CM2
LVAS-AP4: 16.7 CM2
LVEDVI(A2C): 31.84 CM3/M2
LVEDVI(BP): 33.67 CM3/M2
LVESVI(A2C): 12.03 CM3/M2
LVESVI(BP): 14.44 CM3/M2
LVLD-AP2: 8.6 CM
LVLD-AP4: 8.2 CM
LVLS-AP2: 6.45 CM
LVLS-AP4: 7.54 CM
LVOT 2D: 2.4 CM
LVOT A: 4.52 CM2
LVOT MG: 2 MMHG
LVOT MV: 0.64 M/S
LVOT PEAK VELOCITY: 1 M/S
LVOT PG: 4 MMHG
LVOT STROKE VOLUME INDEX: 44.34 ML/M2
LVOT VTI: 20.3 CM
MLH CV ECHO AVA INDEX VELOCITY RATIO: 1.4
MV E'TISSUE VEL-LAT: 0.12 M/S
MV E'TISSUE VEL-MED: 0.08 M/S
MV PEAK A VEL: 0.48 M/S
MV PEAK E VEL: 0.48 M/S
MV STENOSIS PRESSURE HALF TIME: 116 MS
MV VALVE AREA P 1/2 METHOD: 1.9 CM2
POSTERIOR WALL: 0.77 CM
RAP: 3 MMHG
RVOT VMAX: 0.52 M/S
SEPTAL TISSUE DOPPLER FREE WALL LATE DIA VELOCITY (APICAL 4 CHAMBER VIEW): 0.11 M/S
TR MAX PG: 18.49 MMHG
TRICUSPID VALVE PEAK REGURGITATION VELOCITY: 2.15 M/S
Z-SCORE OF LEFT VENTRICULAR DIMENSION IN END DIASTOLE: -1.28
Z-SCORE OF LEFT VENTRICULAR DIMENSION IN END SYSTOLE: -1.9
Z-SCORE OF LEFT VENTRICULAR POSTERIOR WALL IN END DIASTOLE: -0.98

## 2024-02-07 PROCEDURE — 93000 ELECTROCARDIOGRAM COMPLETE: CPT | Performed by: INTERNAL MEDICINE

## 2024-02-07 PROCEDURE — 99214 OFFICE O/P EST MOD 30 MIN: CPT | Performed by: INTERNAL MEDICINE

## 2024-02-07 PROCEDURE — 3074F SYST BP LT 130 MM HG: CPT | Performed by: INTERNAL MEDICINE

## 2024-02-07 PROCEDURE — 3008F BODY MASS INDEX DOCD: CPT | Performed by: INTERNAL MEDICINE

## 2024-02-07 PROCEDURE — 3079F DIAST BP 80-89 MM HG: CPT | Performed by: INTERNAL MEDICINE

## 2024-02-07 PROCEDURE — 93306 TTE W/DOPPLER COMPLETE: CPT | Performed by: INTERNAL MEDICINE

## 2024-02-07 RX ORDER — ROSUVASTATIN CALCIUM 10 MG/1
10 TABLET, COATED ORAL DAILY
Qty: 90 TABLET | Refills: 1 | Status: SHIPPED | OUTPATIENT
Start: 2024-02-07 | End: 2024-08-12

## 2024-02-07 RX ORDER — DIFLUPREDNATE OPHTHALMIC 0.5 MG/ML
1 EMULSION OPHTHALMIC DAILY
COMMUNITY
Start: 2023-12-26 | End: 2025-02-05 | Stop reason: ALTCHOICE

## 2024-02-07 RX ORDER — LIDOCAINE 50 MG/G
1 PATCH TOPICAL AS NEEDED
COMMUNITY
Start: 2023-11-28

## 2024-02-07 NOTE — ASSESSMENT & PLAN NOTE
LDL cholesterol 99 he has a diagnosis of arteriosclerotic plaque on his thoracic aorta seen on CT in 2022 promises to start the medication he will call in a few months goal is LDL under 70

## 2024-02-07 NOTE — PATIENT INSTRUCTIONS
Labs after you are on crestor 3-4 months call for results  One year labs visit echo\  \  Cta at [place] follow up there

## 2024-02-07 NOTE — ASSESSMENT & PLAN NOTE
horacic aortic aneurysm with plaque and ulceration follows at [facility]  Last CTA was 2022 stable in size from 2018 was 4.4 by CTA echo today February 2024 stable 4.1 cm  With the nephew having thoracic aneurysm  His genetic testing was unremarkable    He has no evidence of obstructive CAD with coronary calcium score of 0 in 2020  I started him on statin therapy with arterioscleet of his thoracic aorta, he promises to start it  Is baseline LDL cholesterol 99rosis       I gave him a slip to have CTA of his thoracic aorta  And he will follow-up at [facility]  He will follow up with us in 1 year with echocardiogram

## 2024-02-07 NOTE — ASSESSMENT & PLAN NOTE
Skin reaction to ARB blood pressure is controlled with amlodipine and beta-blocker goal is consistently to be under 130 systolic

## 2024-02-15 ENCOUNTER — TELEPHONE (OUTPATIENT)
Dept: SCHEDULING | Facility: CLINIC | Age: 59
End: 2024-02-15
Payer: COMMERCIAL

## 2024-03-28 DIAGNOSIS — I71.20 THORACIC AORTIC ANEURYSM WITHOUT RUPTURE, UNSPECIFIED PART (CMS/HCC): ICD-10-CM

## 2024-04-08 NOTE — TELEPHONE ENCOUNTER
Medicine Refill Request    Last Office Visit: 7/6/2022   Last Consult Visit: 12/13/2023  Last Telemedicine Visit: Visit date not found    Next Appointment: Visit date not found      Current Outpatient Medications:     amLODIPine (NORVASC) 2.5 mg tablet, Take 1 tablet (2.5 mg total) by mouth daily. (Patient taking differently: Take 5 mg by mouth daily.), Disp: 90 tablet, Rfl: 1    beclomethasone dipropionate (QVAR INHL), Inhale as needed., Disp: , Rfl:     cetirizine (ZyrTEC) 10 mg tablet, Take 10 mg by mouth as needed., Disp: , Rfl:     cyclobenzaprine (FLEXERIL) 10 mg tablet, Take 1 tablet (10 mg total) by mouth 2 (two) times a day as needed for muscle spasms for up to 14 days., Disp: 20 tablet, Rfl: 1    difluprednate (DUREZOL) 0.05 % drops, Administer 1 drop into both eyes daily., Disp: , Rfl:     lidocaine (LIDODERM) 5 % patch, Place 1 patch on the skin as needed., Disp: , Rfl:     metoprolol succinate XL (TOPROL-XL) 50 mg 24 hr tablet, TAKE 1 TABLET BY MOUTH EVERY DAY, Disp: 90 tablet, Rfl: 3    mometasone (NASONEX) 50 mcg/actuation nasal spray, Administer 1 spray into affected nostril(s) daily as needed., Disp: , Rfl:     PROAIR RESPICLICK 90 mcg/actuation inhaler, Inhale 2 puffs every 4 (four) hours as needed., Disp: , Rfl: 3    rosuvastatin (CRESTOR) 10 mg tablet, Take 1 tablet (10 mg total) by mouth daily. (Patient not taking: Reported on 2/7/2024), Disp: 90 tablet, Rfl: 1    rosuvastatin (CRESTOR) 10 mg tablet, Take 1 tablet (10 mg total) by mouth daily., Disp: 90 tablet, Rfl: 1    sildenafiL (VIAGRA) 50 mg tablet, Take 1 tablet (50 mg total) by mouth daily as needed for erectile dysfunction., Disp: 10 tablet, Rfl: 0      BP Readings from Last 3 Encounters:   02/07/24 128/80   02/07/24 122/80   12/13/23 126/80       Recent Lab results:  Lab Results   Component Value Date    CHOL 173 12/21/2023   ,   Lab Results   Component Value Date    HDL 64 12/21/2023   ,   Lab Results   Component Value Date    LDLCALC  99 12/21/2023   ,   Lab Results   Component Value Date    TRIG 51 12/21/2023        Lab Results   Component Value Date    GLUCOSE 91 12/21/2023   ,   Lab Results   Component Value Date    HGBA1C 5.0 02/29/2016         Lab Results   Component Value Date    CREATININE 0.91 12/21/2023       Lab Results   Component Value Date    TSH 3.570 12/21/2023           Lab Results   Component Value Date    HGBA1C 5.0 02/29/2016

## 2024-04-09 RX ORDER — SILDENAFIL 50 MG/1
TABLET, FILM COATED ORAL
Qty: 10 TABLET | Refills: 0 | Status: SHIPPED | OUTPATIENT
Start: 2024-04-09 | End: 2024-08-30

## 2024-07-25 ENCOUNTER — OFFICE VISIT (OUTPATIENT)
Dept: INTERNAL MEDICINE | Facility: CLINIC | Age: 59
End: 2024-07-25
Payer: COMMERCIAL

## 2024-07-25 VITALS
WEIGHT: 186 LBS | HEIGHT: 72 IN | HEART RATE: 75 BPM | SYSTOLIC BLOOD PRESSURE: 128 MMHG | OXYGEN SATURATION: 98 % | TEMPERATURE: 98.3 F | DIASTOLIC BLOOD PRESSURE: 86 MMHG | BODY MASS INDEX: 25.19 KG/M2

## 2024-07-25 DIAGNOSIS — I70.0 ARTERIOSCLEROSIS OF AORTA (CMS/HCC): ICD-10-CM

## 2024-07-25 DIAGNOSIS — R23.3 SPONTANEOUS ECCHYMOSES: Primary | ICD-10-CM

## 2024-07-25 PROCEDURE — 99214 OFFICE O/P EST MOD 30 MIN: CPT | Performed by: FAMILY MEDICINE

## 2024-07-25 PROCEDURE — 3008F BODY MASS INDEX DOCD: CPT | Performed by: FAMILY MEDICINE

## 2024-07-25 PROCEDURE — 3074F SYST BP LT 130 MM HG: CPT | Performed by: FAMILY MEDICINE

## 2024-07-25 PROCEDURE — 3079F DIAST BP 80-89 MM HG: CPT | Performed by: FAMILY MEDICINE

## 2024-07-25 RX ORDER — AMLODIPINE BESYLATE 2.5 MG/1
5 TABLET ORAL DAILY
Start: 2024-07-25 | End: 2025-02-05

## 2024-07-25 ASSESSMENT — ENCOUNTER SYMPTOMS
CHEST TIGHTNESS: 0
FATIGUE: 0
PALPITATIONS: 0
COLOR CHANGE: 1
DIZZINESS: 0
FEVER: 0
COUGH: 0
ARTHRALGIAS: 0
SHORTNESS OF BREATH: 0

## 2024-07-25 ASSESSMENT — PATIENT HEALTH QUESTIONNAIRE - PHQ9: SUM OF ALL RESPONSES TO PHQ9 QUESTIONS 1 & 2: 0

## 2024-07-25 NOTE — PATIENT INSTRUCTIONS
Labs ordered.  Hold the crestor for a month or so and we will monitor the bruising.  Consider hematology for peripheral blood smear if persisting.

## 2024-07-25 NOTE — PROGRESS NOTES
Vincent Riggs MD   [facility] in [address]   [id number] [phone]     Reason for visit:   Chief Complaint   Patient presents with    Red bruising and red blotches on right arm        PCP: Odilia Early MD     Patient: Jesus Ocononr      Age: 58 y.o. male (1965)    MRN: [id number]  Office Visit: 7/25/2024       Jesus Oconnor is a 58 y.o. male who presents for bruising.    He has recently noticed random scattered ecchymoses.  Currently they are on his right forearm but he has had done on his knee and left hand.  There is no increased trauma or injury.  No change in activity.  He has been back on Crestor consistently since earlier this year.  He did stop the medication over the past week or 2, to see if that is what was causing his symptoms.  He does have a new lesion however after stopping the medication.       Patient Active Problem List   Diagnosis    Thoracic aortic aneurysm without rupture (CMS/HCC)    Essential hypertension    Mixed hyperlipidemia    Family history of thoracic aortic aneurysm    Allergic rhinitis    Lumbar spondylosis    Erectile dysfunction    PVD (peripheral vascular disease) (CMS/HCC)       Past Medical History:   Diagnosis Date    Asthma     Cataract cortical, senile     Hypertension        Past Surgical History:   Procedure Laterality Date    ADENOIDECTOMY      BACK SURGERY      CATARACT EXTRACTION Bilateral        Social History     Tobacco Use    Smoking status: Never    Smokeless tobacco: Never   Vaping Use    Vaping Use: Never used   Substance Use Topics    Alcohol use: Yes     Alcohol/week: 14.0 standard drinks of alcohol     Types: 14 Glasses of wine per week    Drug use: Never       Family History   Problem Relation Age of Onset    Breast cancer Biological Mother     Heart disease Biological Father     Hypertension Biological Father     Hyperlipidemia Biological Father     No Known Problems Biological Brother      Tuberculosis Maternal Grandmother     No Known Problems Maternal Grandfather     No Known Problems Paternal Grandmother     Heart disease Paternal Grandfather     Heart attack Father's Brother        Allergies   Allergen Reactions    Losartan Potassium      Other reaction(s): bumpy skin       Current Outpatient Medications   Medication Sig Dispense Refill    amLODIPine (NORVASC) 2.5 mg tablet Take 2 tablets (5 mg total) by mouth daily.      beclomethasone dipropionate (QVAR INHL) Inhale as needed.      cetirizine (ZyrTEC) 10 mg tablet Take 10 mg by mouth as needed.      cyclobenzaprine (FLEXERIL) 10 mg tablet Take 1 tablet (10 mg total) by mouth 2 (two) times a day as needed for muscle spasms for up to 14 days. 20 tablet 1    difluprednate (DUREZOL) 0.05 % drops Administer 1 drop into both eyes daily.      lidocaine (LIDODERM) 5 % patch Place 1 patch on the skin as needed.      metoprolol succinate XL (TOPROL-XL) 50 mg 24 hr tablet TAKE 1 TABLET BY MOUTH EVERY DAY 90 tablet 3    mometasone (NASONEX) 50 mcg/actuation nasal spray Administer 1 spray into affected nostril(s) daily as needed.      PROAIR RESPICLICK 90 mcg/actuation inhaler Inhale 2 puffs every 4 (four) hours as needed.  3    rosuvastatin (CRESTOR) 10 mg tablet Take 1 tablet (10 mg total) by mouth daily. 90 tablet 1    sildenafiL (VIAGRA) 50 mg tablet TAKE 1 TABLET BY MOUTH DAILY AS NEEDED FOR ERECTILE DYSFUNCTION 10 tablet 0     No current facility-administered medications for this visit.       Review of Systems   Constitutional:  Negative for fatigue and fever.   Eyes:  Negative for visual disturbance.   Respiratory:  Negative for cough, chest tightness and shortness of breath.    Cardiovascular:  Negative for chest pain, palpitations and leg swelling.   Musculoskeletal:  Negative for arthralgias.   Skin:  Positive for color change. Negative for rash.   Neurological:  Negative for dizziness.       Objective   Vitals:    07/25/24 1131   BP: 128/86    BP Location: Right upper arm   Patient Position: Sitting   Pulse: 75   Temp: 36.8 °C (98.3 °F)   TempSrc: Oral   SpO2: 98%   Weight: 84.4 kg (186 lb)   Height: 1.829 m (6')     Body mass index is 25.23 kg/m².    Physical Exam  Vitals and nursing note reviewed.   Constitutional:       Appearance: Normal appearance. He is not toxic-appearing.   Eyes:      Conjunctiva/sclera: Conjunctivae normal.   Pulmonary:      Effort: Pulmonary effort is normal.   Musculoskeletal:      Cervical back: Normal range of motion.   Skin:         Neurological:      General: No focal deficit present.      Mental Status: He is alert.   Psychiatric:         Mood and Affect: Mood normal.         Behavior: Behavior normal.         Lab Results   Component Value Date    WBC 5.6 12/21/2023    HGB 15.0 12/21/2023    HCT 42.6 12/21/2023     12/21/2023    CHOL 173 12/21/2023    TRIG 51 12/21/2023    HDL 64 12/21/2023    ALT 13 12/21/2023    AST 21 12/21/2023     12/21/2023    K 4.7 12/21/2023     12/21/2023    CREATININE 0.91 12/21/2023    BUN 8 12/21/2023    CO2 24 12/21/2023    TSH 3.570 12/21/2023    PSA 0.2 12/21/2023    HGBA1C 5.0 02/29/2016              Assessment    Diagnosis Plan   1. Spontaneous ecchymoses  CBC and differential    Protime-INR    PTT    Comprehensive metabolic panel    CBC and differential    Protime-INR    PTT    Comprehensive metabolic panel      2. Arteriosclerosis of aorta (CMS/HCC)               Plan     At this point I have very little concern regarding these ecchymoses.  Given the new onset, we will get a few additional labs for reassurance.  If symptoms worsen or persist I would consider hematology eval and peripheral blood smear.  He will remain off the statin for short period of time while monitoring for improvement.    Orders Placed This Encounter   Procedures    CBC and differential     Standing Status:   Future     Number of Occurrences:   1     Standing Expiration Date:   7/25/2025      Order Specific Question:   Release to patient     Answer:   Immediate [1]    Protime-INR     Standing Status:   Future     Number of Occurrences:   1     Standing Expiration Date:   7/25/2025     Order Specific Question:   Release to patient     Answer:   Immediate [1]    PTT     Standing Status:   Future     Number of Occurrences:   1     Standing Expiration Date:   7/25/2025     Order Specific Question:   Release to patient     Answer:   Immediate [1]    Comprehensive metabolic panel     Standing Status:   Future     Number of Occurrences:   1     Standing Expiration Date:   7/25/2025     Order Specific Question:   Release to patient     Answer:   Immediate [1]       New Medications Ordered This Visit   Medications    amLODIPine (NORVASC) 2.5 mg tablet     Sig: Take 2 tablets (5 mg total) by mouth daily.       Medications Discontinued During This Encounter   Medication Reason    rosuvastatin (CRESTOR) 10 mg tablet     amLODIPine (NORVASC) 2.5 mg tablet        Return if symptoms worsen or fail to improve.    Patient Instructions   Labs ordered.  Hold the crestor for a month or so and we will monitor the bruising.  Consider hematology for peripheral blood smear if persisting.       Vincent Riggs MD  7/25/2024

## 2024-08-12 RX ORDER — ROSUVASTATIN CALCIUM 10 MG/1
10 TABLET, COATED ORAL DAILY
Qty: 90 TABLET | Refills: 3 | Status: SHIPPED | OUTPATIENT
Start: 2024-08-12 | End: 2025-02-06

## 2024-08-21 DIAGNOSIS — E78.2 MIXED HYPERLIPIDEMIA: ICD-10-CM

## 2024-08-21 DIAGNOSIS — I71.20 THORACIC AORTIC ANEURYSM WITHOUT RUPTURE, UNSPECIFIED PART (CMS/HCC): Primary | ICD-10-CM

## 2024-08-21 DIAGNOSIS — Z82.49 FAMILY HISTORY OF THORACIC AORTIC ANEURYSM: ICD-10-CM

## 2024-08-21 DIAGNOSIS — I10 ESSENTIAL HYPERTENSION: ICD-10-CM

## 2024-08-30 RX ORDER — SILDENAFIL 50 MG/1
TABLET, FILM COATED ORAL
Qty: 10 TABLET | Refills: 0 | Status: SHIPPED | OUTPATIENT
Start: 2024-08-30 | End: 2025-02-13

## 2024-08-30 NOTE — TELEPHONE ENCOUNTER
Medicine Refill Request    Last Office Visit: 7/25/2024   Last Consult Visit: 12/13/2023  Last Telemedicine Visit: Visit date not found    Next Appointment: Visit date not found      Current Outpatient Medications:     amLODIPine (NORVASC) 2.5 mg tablet, Take 2 tablets (5 mg total) by mouth daily., Disp: , Rfl:     beclomethasone dipropionate (QVAR INHL), Inhale as needed., Disp: , Rfl:     cetirizine (ZyrTEC) 10 mg tablet, Take 10 mg by mouth as needed., Disp: , Rfl:     cyclobenzaprine (FLEXERIL) 10 mg tablet, Take 1 tablet (10 mg total) by mouth 2 (two) times a day as needed for muscle spasms for up to 14 days., Disp: 20 tablet, Rfl: 1    difluprednate (DUREZOL) 0.05 % drops, Administer 1 drop into both eyes daily., Disp: , Rfl:     lidocaine (LIDODERM) 5 % patch, Place 1 patch on the skin as needed., Disp: , Rfl:     metoprolol succinate XL (TOPROL-XL) 50 mg 24 hr tablet, TAKE 1 TABLET BY MOUTH EVERY DAY, Disp: 90 tablet, Rfl: 3    mometasone (NASONEX) 50 mcg/actuation nasal spray, Administer 1 spray into affected nostril(s) daily as needed., Disp: , Rfl:     PROAIR RESPICLICK 90 mcg/actuation inhaler, Inhale 2 puffs every 4 (four) hours as needed., Disp: , Rfl: 3    rosuvastatin (CRESTOR) 10 mg tablet, TAKE 1 TABLET BY MOUTH EVERY DAY, Disp: 90 tablet, Rfl: 3    sildenafiL (VIAGRA) 50 mg tablet, TAKE 1 TABLET BY MOUTH DAILY AS NEEDED FOR ERECTILE DYSFUNCTION, Disp: 10 tablet, Rfl: 0      BP Readings from Last 3 Encounters:   07/25/24 128/86   02/07/24 128/80   02/07/24 122/80       Recent Lab results:  Lab Results   Component Value Date    CHOL 173 12/21/2023   ,   Lab Results   Component Value Date    HDL 64 12/21/2023   ,   Lab Results   Component Value Date    LDLCALC 99 12/21/2023   ,   Lab Results   Component Value Date    TRIG 51 12/21/2023        Lab Results   Component Value Date    GLUCOSE 91 12/21/2023   ,   Lab Results   Component Value Date    HGBA1C 5.0 02/29/2016         Lab Results   Component  Value Date    CREATININE 0.91 12/21/2023       Lab Results   Component Value Date    TSH 3.570 12/21/2023           Lab Results   Component Value Date    HGBA1C 5.0 02/29/2016

## 2024-10-07 DIAGNOSIS — I10 ESSENTIAL HYPERTENSION: Primary | ICD-10-CM

## 2024-10-07 RX ORDER — METOPROLOL SUCCINATE 50 MG/1
50 TABLET, EXTENDED RELEASE ORAL DAILY
Qty: 90 TABLET | Refills: 3 | Status: SHIPPED | OUTPATIENT
Start: 2024-10-07

## 2024-11-06 NOTE — TELEPHONE ENCOUNTER
Pt called requesting a refill on metoprolol sent to [facility](in chart).    
Rx for toprol xl 50 mg tab daily sent into pharmacy.    
Lauri Krishna

## 2025-02-01 LAB
ALBUMIN SERPL-MCNC: 4.6 G/DL (ref 3.8–4.9)
ALP SERPL-CCNC: 61 IU/L (ref 44–121)
ALT SERPL-CCNC: 17 IU/L (ref 0–44)
APTT PPP: 27 SEC (ref 24–33)
AST SERPL-CCNC: 26 IU/L (ref 0–40)
BASOPHILS # BLD AUTO: 0.1 X10E3/UL (ref 0–0.2)
BASOPHILS NFR BLD AUTO: 1 %
BILIRUB SERPL-MCNC: 0.8 MG/DL (ref 0–1.2)
BUN SERPL-MCNC: 7 MG/DL (ref 8–27)
BUN/CREAT SERPL: 8 (ref 10–24)
CALCIUM SERPL-MCNC: 9.5 MG/DL (ref 8.6–10.2)
CHLORIDE SERPL-SCNC: 101 MMOL/L (ref 96–106)
CHOLEST SERPL-MCNC: 192 MG/DL (ref 100–199)
CO2 SERPL-SCNC: 20 MMOL/L (ref 20–29)
CREAT SERPL-MCNC: 0.84 MG/DL (ref 0.76–1.27)
EGFRCR SERPLBLD CKD-EPI 2021: 100 ML/MIN/1.73
EOSINOPHIL # BLD AUTO: 0.4 X10E3/UL (ref 0–0.4)
EOSINOPHIL NFR BLD AUTO: 8 %
ERYTHROCYTE [DISTWIDTH] IN BLOOD BY AUTOMATED COUNT: 12.6 % (ref 11.6–15.4)
GLOBULIN SER CALC-MCNC: 2.9 G/DL (ref 1.5–4.5)
GLUCOSE SERPL-MCNC: 86 MG/DL (ref 70–99)
HCT VFR BLD AUTO: 42.7 % (ref 37.5–51)
HDLC SERPL-MCNC: 56 MG/DL
HGB BLD-MCNC: 14.6 G/DL (ref 13–17.7)
IMM GRANULOCYTES # BLD AUTO: 0 X10E3/UL (ref 0–0.1)
IMM GRANULOCYTES NFR BLD AUTO: 0 %
INR PPP: 1 (ref 0.9–1.2)
LDLC SERPL CALC-MCNC: 121 MG/DL (ref 0–99)
LYMPHOCYTES # BLD AUTO: 1.3 X10E3/UL (ref 0.7–3.1)
LYMPHOCYTES NFR BLD AUTO: 26 %
MCH RBC QN AUTO: 31.9 PG (ref 26.6–33)
MCHC RBC AUTO-ENTMCNC: 34.2 G/DL (ref 31.5–35.7)
MCV RBC AUTO: 93 FL (ref 79–97)
MONOCYTES # BLD AUTO: 0.7 X10E3/UL (ref 0.1–0.9)
MONOCYTES NFR BLD AUTO: 13 %
NEUTROPHILS # BLD AUTO: 2.7 X10E3/UL (ref 1.4–7)
NEUTROPHILS NFR BLD AUTO: 52 %
PLATELET # BLD AUTO: 223 X10E3/UL (ref 150–450)
POTASSIUM SERPL-SCNC: 4.5 MMOL/L (ref 3.5–5.2)
PROT SERPL-MCNC: 7.5 G/DL (ref 6–8.5)
PROTHROMBIN TIME: 11 SEC (ref 9.1–12)
RBC # BLD AUTO: 4.58 X10E6/UL (ref 4.14–5.8)
SODIUM SERPL-SCNC: 137 MMOL/L (ref 134–144)
TRIGL SERPL-MCNC: 82 MG/DL (ref 0–149)
VLDLC SERPL CALC-MCNC: 15 MG/DL (ref 5–40)
WBC # BLD AUTO: 5.1 X10E3/UL (ref 3.4–10.8)

## 2025-02-05 ENCOUNTER — OFFICE VISIT (OUTPATIENT)
Dept: CARDIOLOGY | Facility: CLINIC | Age: 60
End: 2025-02-05
Payer: COMMERCIAL

## 2025-02-05 ENCOUNTER — HOSPITAL ENCOUNTER (OUTPATIENT)
Dept: CARDIOLOGY | Facility: CLINIC | Age: 60
Discharge: HOME | End: 2025-02-05
Attending: INTERNAL MEDICINE
Payer: COMMERCIAL

## 2025-02-05 VITALS
DIASTOLIC BLOOD PRESSURE: 78 MMHG | WEIGHT: 192 LBS | SYSTOLIC BLOOD PRESSURE: 114 MMHG | OXYGEN SATURATION: 98 % | HEART RATE: 60 BPM | HEIGHT: 72 IN | BODY MASS INDEX: 26.01 KG/M2

## 2025-02-05 VITALS
DIASTOLIC BLOOD PRESSURE: 90 MMHG | HEIGHT: 72 IN | BODY MASS INDEX: 26.01 KG/M2 | WEIGHT: 192 LBS | SYSTOLIC BLOOD PRESSURE: 140 MMHG

## 2025-02-05 DIAGNOSIS — E78.2 MIXED HYPERLIPIDEMIA: ICD-10-CM

## 2025-02-05 DIAGNOSIS — Z82.49 FAMILY HISTORY OF THORACIC AORTIC ANEURYSM: ICD-10-CM

## 2025-02-05 DIAGNOSIS — I10 ESSENTIAL HYPERTENSION: Primary | ICD-10-CM

## 2025-02-05 DIAGNOSIS — I77.810 ASCENDING AORTA DILATATION (CMS/HCC): ICD-10-CM

## 2025-02-05 DIAGNOSIS — I10 ESSENTIAL HYPERTENSION: ICD-10-CM

## 2025-02-05 DIAGNOSIS — I71.20 THORACIC AORTIC ANEURYSM WITHOUT RUPTURE, UNSPECIFIED PART (CMS/HCC): ICD-10-CM

## 2025-02-05 LAB
ATRIAL RATE: 55
P AXIS: 55
PR INTERVAL: 202
QRS DURATION: 104
QT INTERVAL: 444
QTC CALCULATION(BAZETT): 424
R AXIS: 52
T WAVE AXIS: 55
VENTRICULAR RATE: 55

## 2025-02-05 PROCEDURE — 93306 TTE W/DOPPLER COMPLETE: CPT | Performed by: INTERNAL MEDICINE

## 2025-02-05 PROCEDURE — 3078F DIAST BP <80 MM HG: CPT | Performed by: INTERNAL MEDICINE

## 2025-02-05 PROCEDURE — 3074F SYST BP LT 130 MM HG: CPT | Performed by: INTERNAL MEDICINE

## 2025-02-05 PROCEDURE — 3008F BODY MASS INDEX DOCD: CPT | Performed by: INTERNAL MEDICINE

## 2025-02-05 PROCEDURE — 99215 OFFICE O/P EST HI 40 MIN: CPT | Performed by: INTERNAL MEDICINE

## 2025-02-05 PROCEDURE — 93000 ELECTROCARDIOGRAM COMPLETE: CPT | Performed by: INTERNAL MEDICINE

## 2025-02-05 RX ORDER — BUDESONIDE 180 UG/1
2 AEROSOL, POWDER RESPIRATORY (INHALATION) 2 TIMES DAILY
COMMUNITY
Start: 2025-01-21

## 2025-02-05 NOTE — PROGRESS NOTES
Kunal Catalan MD [facility]  Cardiology    [facility]    [facility]  The [facility] [address]  [address] [place] [address]    TEL  [phone]  [facility]     2025    Re:  Jesus Oconnor  : 1965    Primary care: Dr Odilia Early     Referring Physician: Dr Hubert Schrader    It was a pleasure to meet Jesus Oconnor in the office for the first time today. No cardiopulmonary symptoms. Bruising noted. Stopped crestor.  Bruising resolved.      Diet: described as good   Exercise: bike, walk, swim     PAST CARDIOVASCULAR HISTORY:  Dilated aortic root and ascending thoracic aorta    RISK ENHANCERS:  Dilated aorta    Past Medical History:   Diagnosis Date    Asthma     Cataract cortical, senile     Hypertension          FAMILY HISTORY:   There is no family history of premature coronary artery disease, sudden cardiac death or cardiomyopathy.    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   Smoking: None  Alcohol: None  Drugs: None    MEDICATIONS:    Outpatient Encounter Medications as of 2025:     PULMICORT FLEXHALER 180 mcg/actuation inhaler, Inhale 2 puffs 2 (two) times a day.    metoprolol succinate XL (TOPROL-XL) 50 mg 24 hr tablet, Take 1 tablet (50 mg total) by mouth daily.    sildenafiL (VIAGRA) 50 mg tablet, TAKE 1 TABLET BY MOUTH DAILY AS NEEDED FOR ERECTILE DYSFUNCTION.    rosuvastatin (CRESTOR) 10 mg tablet, TAKE 1 TABLET BY MOUTH EVERY DAY (Patient not taking: Reported on 2025)    amLODIPine (NORVASC) 2.5 mg tablet, Take 2 tablets (5 mg total) by mouth daily. (Patient taking differently: Take 10 mg by mouth daily.)    lidocaine (LIDODERM) 5 % patch, Place 1 patch on the skin as needed.    difluprednate (DUREZOL) 0.05 % drops, Administer 1 drop into both eyes daily. (Patient not taking: Reported on  2/5/2025)    cyclobenzaprine (FLEXERIL) 10 mg tablet, Take 1 tablet (10 mg total) by mouth 2 (two) times a day as needed for muscle spasms for up to 14 days. (Patient not taking: Reported on 2/5/2025)    beclomethasone dipropionate (QVAR INHL), Inhale as needed.    mometasone (NASONEX) 50 mcg/actuation nasal spray, Administer 1 spray into affected nostril(s) daily as needed. (Patient not taking: Reported on 2/5/2025)    PROAIR RESPICLICK 90 mcg/actuation inhaler, Inhale 2 puffs every 4 (four) hours as needed.    cetirizine (ZyrTEC) 10 mg tablet, Take 10 mg by mouth as needed.    Visit Vitals  /78 (BP Location: Left upper arm, Patient Position: Sitting)   Pulse 60   Ht 1.829 m (6')   Wt 87.1 kg (192 lb)   SpO2 98%   BMI 26.04 kg/m²   Body surface area is 2.1 meters squared.    PHYSICAL EXAMINATION:  General: Pleasant and in no acute distress. Tall.   HEENT: No corneal arcus.  Earlobe crease  Neck: Supple.  JVP is 6 cm/H2O.  Carotids are equal with no audible bruits.  No thyromegaly.  Heart: Normal S1 and S2.  No S4. No S3. No murmur.  Chest: Symmetrical.  Lungs: Clear bilaterally without rales, wheezes nor rhonchi.  Extremities: No edema.  Distal pulses are easily palpable.  Skin: Warm and dry and well perfused.  Neurologic: Alert and appropriate, moving all four extremities. Gait is normal  Psychiatric: Normal mood, affect & judgment.    DIAGNOSTIC DATA:    ECG: sinus bradycardia, normal ECG     PREVIOUS ECG:     Labs:   Lab Results   Component Value Date     01/31/2025    K 4.5 01/31/2025    BUN 7 (L) 01/31/2025    CREATININE 0.84 01/31/2025    EGFR 100 01/31/2025    WBC 5.1 01/31/2025    HGB 14.6 01/31/2025     01/31/2025    ALT 17 01/31/2025    AST 26 01/31/2025    GLUCOSE 86 01/31/2025    HGBA1C 5.0 02/29/2016    TSH 3.570 12/21/2023    INR 1.0 01/31/2025       Lipid Profile:    Lab Results   Component Value Date    CHOL 192 01/31/2025    CHOL 173 12/21/2023    CHOL 179 10/04/2022     Lab  Results   Component Value Date    TRIG 82 01/31/2025    TRIG 51 12/21/2023    TRIG 84 10/04/2022     Lab Results   Component Value Date    HDL 56 01/31/2025    HDL 64 12/21/2023    HDL 65 10/04/2022     Lab Results   Component Value Date    LDLCALC 121 (H) 01/31/2025    LDLCALC 99 12/21/2023    LDLCALC 99 10/04/2022       Imaging:     Transthoracic echo (TTE) complete 02/05/2025    Interpretation Summary    Left Ventricle: Normal ventricle size. Normal wall thickness. Preserved systolic function. Estimated EF 60-65%. No regional wall motion abnormalities. Normal diastolic filling pattern for age.    Right Ventricle: Normal ventricle size. Normal systolic function.    Left Atrium: Normal sized atrium.    Right Atrium: Normal sized atrium.    Mitral Valve: Normal leaflet structure. Normal leaflet motion. Trace regurgitation. No stenosis.    Aortic Valve: Tricuspid valve. No regurgitation. No stenosis. Calculated dimensionless index = 0.80.    Aorta: Mild dilatation of the aortic root. Mild dilatation of the ascending aorta.    Tricuspid Valve: Normal structure. Trace regurgitation. Estimated RVSP = 17 mmHg. No significant stenosis.    Pulmonic Valve: Normal structure. Mild regurgitation. No stenosis.    Pericardium: Normal structure. No evidence of pericardial effusion.    Compared to the prior study from 2/7/2024 there is no significant change.  Right ventricular cavity size is at the upper limit of normal.      aorta height index of 2.3 puts him at low risk.    ASSESSMENT/PLAN:    Dilated aortic root  Dilated ascending thoracic aorta    Based on his aorta height index Jesus is at low risk.  In terms of nomenclature, given that his aortic size is less than 4.5 cm and there is no alyson saccular or fusiform aneurysm by visual inspection I have de-escalated his diagnosis to dilated aortic root and dilated ascending thoracic aorta respectively.  We discussed he should avoid things that create sustained intrathoracic  pressure elevation in general which he is aware of.  He was not previously aware of avoiding fluoroquinolone based antibiotics and we discussed this today.  Managing hypertension should be the next priority and his blood pressure is well-controlled I have made no changes (see below).    Doesn't need annual imaging at this point. Aorta size stable for 9 years.    Primary hypertension    Continue amlodipine 5 mg daily  Continue metoprolol succinate 50 mg daily    Pure hypercholesterolemia  Side effect of statin therapy    He has no coronary artery calcification.  He has some peripheral vascular atherosclerosis reported on a CT scan that I do not have access to.  He is interested in medication minimization particularly in the context of the side effect of easy bruising.  We discussed several potential options (starting a nonstatin alternative, continue off statin therapy, repeat calcium score). We agreed on repeating calcium score.     Thank you for allowing me to share in the care of your patient.  I asked Jesus  to return in 12 months.  If you have any further questions, please do not hesitate to contact me.    Sincerely,        Kunal Catalan MD, [facility]     The total time for this visit was 40 minutes and was comprised of chart preparation/review, office visit (gathering HPI, examination, independent testing review, plan formulation/discussion and patient education), processing orders and completing documentation.

## 2025-02-06 LAB
AORTIC ROOT ANNULUS: 4.1 CM
AORTIC VALVE MEAN VELOCITY: 0.8 M/S
AORTIC VALVE VELOCITY TIME INTEGRAL: 26.8 CM
ASCENDING AORTA: 4.2 CM
AV MEAN GRADIENT: 3 MMHG
AV PEAK GRADIENT: 5 MMHG
AV PEAK VELOCITY-S: 1.17 M/S
AV VALVE AREA INDEX: 1.87
AV VALVE AREA: 3.29 CM2
AV VELOCITY RATIO: 0.8
AVA (VTI): 3.92 CM2
BSA FOR ECHO PROCEDURE: 2.1 M2
CUSP SEPARATION: 2.3 CM
DOP CALC LVOT STROKE VOLUME: 104.99 ML
E WAVE DECELERATION TIME: 388 MS
E/A RATIO: 1.2
E/E' RATIO: 6.8
E/LAT E' RATIO: 5
EDV (BP): 85.9 CM3
EF (A4C): 62.7 %
EF A2C: 65.1 %
EJECTION FRACTION: 65.6 %
EST RIGHT VENT SYSTOLIC PRESSURE BY TRICUSPID REGURGITATION JET: 17 MMHG
ESV (BP): 31.3 CM3
FRACTIONAL SHORTENING: 43 %
INTERVENTRICULAR SEPTUM: 1.09 CM
LA ESV (BP): 66.1 CM3
LA ESV INDEX (A2C): 33.29 CM3/M2
LA ESV INDEX (BP): 31.48 CM3/M2
LA/AORTA RATIO: 0.98
LAAS-AP2: 20.8 CM2
LAAS-AP4: 20.8 CM2
LAD 2D: 3.9 CM
LALD A4C: 4.83 CM
LALD A4C: 5.8 CM
LAV-S: 69.9 CM3
LEFT ATRIUM VOLUME INDEX: 25.1 CM3/M2
LEFT ATRIUM VOLUME: 52.7 CM3
LEFT INTERNAL DIMENSION IN SYSTOLE: 2.89 CM (ref 3.2–4.85)
LEFT VENTRICLE DIASTOLIC VOLUME INDEX: 41.67 CM3/M2
LEFT VENTRICLE DIASTOLIC VOLUME: 87.5 CM3
LEFT VENTRICLE SYSTOLIC VOLUME INDEX: 15.52 CM3/M2
LEFT VENTRICLE SYSTOLIC VOLUME: 32.6 CM3
LEFT VENTRICULAR INTERNAL DIMENSION IN DIASTOLE: 5.07 CM (ref 5.47–7.6)
LEFT VENTRICULAR POSTERIOR WALL IN END DIASTOLE: 0.86 CM (ref 0.7–1.3)
LV ESV (APICAL 2 CHAMBER): 30 CM3
LVAD-AP2: 31.4 CM2
LVAD-AP4: 29.9 CM2
LVAS-AP2: 15.6 CM2
LVAS-AP4: 16.4 CM2
LVEDVI(BP): 40.9 CM3/M2
LVESVI(A2C): 14.29 CM3/M2
LVESVI(BP): 14.9 CM3/M2
LVLD-AP2: 9.51 CM
LVLD-AP4: 8.62 CM
LVLS-AP2: 7.36 CM
LVLS-AP4: 7.31 CM
LVOT 2D: 2.5 CM
LVOT A: 4.91 CM2
LVOT MG: 2 MMHG
LVOT MV: 0.67 M/S
LVOT PEAK VELOCITY: 1 M/S
LVOT PG: 4 MMHG
LVOT STROKE VOLUME INDEX: 50 ML/M2
LVOT VTI: 21.4 CM
MLH CV ECHO AVA INDEX VELOCITY RATIO: 1.6
MV E'TISSUE VEL-LAT: 0.12 M/S
MV E'TISSUE VEL-MED: 0.09 M/S
MV PEAK A VEL: 0.5 M/S
MV PEAK E VEL: 0.6 M/S
MV STENOSIS PRESSURE HALF TIME: 114 MS
MV VALVE AREA P 1/2 METHOD: 1.93 CM2
POSTERIOR WALL: 0.86 CM
PULMONARY REGURGITATION LATE DIASTOLIC VELOCITY: 0.68 M/S
PV PEAK GRADIENT: 2 MMHG
PV PV: 0.77 M/S
RAP: 3 MMHG
RVOT VMAX: 0.42 M/S
SEPTAL TISSUE DOPPLER FREE WALL LATE DIA VELOCITY (APICAL 4 CHAMBER VIEW): 0.13 M/S
TR MAX PG: 14.14 MMHG
TRICUSPID VALVE PEAK REGURGITATION VELOCITY: 1.88 M/S
Z-SCORE OF LEFT VENTRICULAR DIMENSION IN END DIASTOLE: -2.4
Z-SCORE OF LEFT VENTRICULAR DIMENSION IN END SYSTOLE: -2.45
Z-SCORE OF LEFT VENTRICULAR POSTERIOR WALL IN END DIASTOLE: -0.51

## 2025-02-12 NOTE — TELEPHONE ENCOUNTER
Medicine Refill Request    Last Office Visit: 7/25/2024   Last Consult Visit: 12/13/2023  Last Telemedicine Visit: Visit date not found    Next Appointment: Visit date not found      Current Outpatient Medications:     amLODIPine (NORVASC) 2.5 mg tablet, Take 2 tablets (5 mg total) by mouth daily. (Patient taking differently: Take 10 mg by mouth daily.), Disp: , Rfl:     beclomethasone dipropionate (QVAR INHL), Inhale as needed., Disp: , Rfl:     cetirizine (ZyrTEC) 10 mg tablet, Take 10 mg by mouth as needed., Disp: , Rfl:     lidocaine (LIDODERM) 5 % patch, Place 1 patch on the skin as needed., Disp: , Rfl:     metoprolol succinate XL (TOPROL-XL) 50 mg 24 hr tablet, Take 1 tablet (50 mg total) by mouth daily., Disp: 90 tablet, Rfl: 3    PROAIR RESPICLICK 90 mcg/actuation inhaler, Inhale 2 puffs every 4 (four) hours as needed., Disp: , Rfl: 3    PULMICORT FLEXHALER 180 mcg/actuation inhaler, Inhale 2 puffs 2 (two) times a day., Disp: , Rfl:     sildenafiL (VIAGRA) 50 mg tablet, TAKE 1 TABLET BY MOUTH DAILY AS NEEDED FOR ERECTILE DYSFUNCTION., Disp: 10 tablet, Rfl: 0      BP Readings from Last 3 Encounters:   02/05/25 (!) 140/90   02/05/25 114/78   07/25/24 128/86       Recent Lab results:  Lab Results   Component Value Date    CHOL 192 01/31/2025   ,   Lab Results   Component Value Date    HDL 56 01/31/2025   ,   Lab Results   Component Value Date    LDLCALC 121 (H) 01/31/2025   ,   Lab Results   Component Value Date    TRIG 82 01/31/2025        Lab Results   Component Value Date    GLUCOSE 86 01/31/2025   ,   Lab Results   Component Value Date    HGBA1C 5.0 02/29/2016         Lab Results   Component Value Date    CREATININE 0.84 01/31/2025       Lab Results   Component Value Date    TSH 3.570 12/21/2023           Lab Results   Component Value Date    HGBA1C 5.0 02/29/2016

## 2025-02-13 RX ORDER — SILDENAFIL 50 MG/1
TABLET, FILM COATED ORAL
Qty: 10 TABLET | Refills: 0 | Status: SHIPPED | OUTPATIENT
Start: 2025-02-13

## 2025-08-06 RX ORDER — SILDENAFIL 50 MG/1
50 TABLET, FILM COATED ORAL DAILY PRN
Qty: 10 TABLET | Refills: 0 | Status: SHIPPED | OUTPATIENT
Start: 2025-08-06

## 2025-08-06 NOTE — TELEPHONE ENCOUNTER
Medication Request   Patient PCP: Odilia Early MD  Next Office Visit: 8/12/2025  Has this provider prescribed this medication before?: Yes  Medication Name: sildenafiL (VIAGRA) 50 mg tablet  Medication Dose: 50 mg  Medication Frequency: As needed  Preferred Pharmacy: [facility]/pharmacy #[id number] - [place] - [address] [place] [address]  [place] [address]  Phone: [phone] Fax: [phone]    Please allow 2 business days for your provider to send your medication request or to reach out to discuss.

## 2025-08-06 NOTE — TELEPHONE ENCOUNTER
Pt scheduled appt for 8/12/25 and wants to have refill sent in since he is out.             Medication Request   Patient PCP: Odilia Early MD  Next Office Visit: 8/12/2025  Has this provider prescribed this medication before?: Yes  Medication Name: sildenafiL (VIAGRA) 50 mg tablet  Medication Dose: 50 mg  Medication Frequency: As needed  Preferred Pharmacy: [facility]/pharmacy #[id number] - [place] - [facility] [address] AT [place] [address] [address]  [place] [address]  Phone: [phone] Fax: [phone]    Please allow 2 business days for your provider to send your medication request or to reach out to discuss.         Medicine Refill Request    Last Office Visit: 7/25/2024   Last Consult Visit: 12/13/2023  Last Telemedicine Visit: Visit date not found    Next Appointment: 8/12/2025      Current Outpatient Medications:     amLODIPine (NORVASC) 2.5 mg tablet, Take 2 tablets (5 mg total) by mouth daily. (Patient taking differently: Take 10 mg by mouth daily.), Disp: , Rfl:     beclomethasone dipropionate (QVAR INHL), Inhale as needed., Disp: , Rfl:     cetirizine (ZyrTEC) 10 mg tablet, Take 10 mg by mouth as needed., Disp: , Rfl:     lidocaine (LIDODERM) 5 % patch, Place 1 patch on the skin as needed., Disp: , Rfl:     metoprolol succinate XL (TOPROL-XL) 50 mg 24 hr tablet, Take 1 tablet (50 mg total) by mouth daily., Disp: 90 tablet, Rfl: 3    PROAIR RESPICLICK 90 mcg/actuation inhaler, Inhale 2 puffs every 4 (four) hours as needed., Disp: , Rfl: 3    PULMICORT FLEXHALER 180 mcg/actuation inhaler, Inhale 2 puffs 2 (two) times a day., Disp: , Rfl:     sildenafiL (VIAGRA) 50 mg tablet, TAKE 1 TABLET BY MOUTH DAILY AS NEEDED FOR ERECTILE DYSFUNCTION., Disp: 10 tablet, Rfl: 0    BP Readings from Last 3 Encounters:   02/05/25 (!) 140/90   02/05/25 114/78   07/25/24 128/86       Recent Lab results:  Lab Results   Component Value Date    CHOL 192 01/31/2025   ,   Lab Results   Component Value Date     HDL 56 01/31/2025   ,   Lab Results   Component Value Date    LDLCALC 121 (H) 01/31/2025   ,   Lab Results   Component Value Date    TRIG 82 01/31/2025        Lab Results   Component Value Date    GLUCOSE 86 01/31/2025   ,   Lab Results   Component Value Date    HGBA1C 5.0 02/29/2016         Lab Results   Component Value Date    CREATININE 0.84 01/31/2025       Lab Results   Component Value Date    TSH 3.570 12/21/2023           Lab Results   Component Value Date    HGBA1C 5.0 02/29/2016

## 2025-08-12 ENCOUNTER — OFFICE VISIT (OUTPATIENT)
Dept: INTERNAL MEDICINE | Facility: CLINIC | Age: 60
End: 2025-08-12
Payer: COMMERCIAL

## 2025-08-12 VITALS
HEIGHT: 72 IN | TEMPERATURE: 98.2 F | OXYGEN SATURATION: 97 % | WEIGHT: 194 LBS | SYSTOLIC BLOOD PRESSURE: 112 MMHG | HEART RATE: 59 BPM | BODY MASS INDEX: 26.28 KG/M2 | DIASTOLIC BLOOD PRESSURE: 72 MMHG

## 2025-08-12 DIAGNOSIS — Z23 NEED FOR PNEUMOCOCCAL VACCINE: ICD-10-CM

## 2025-08-12 DIAGNOSIS — N52.9 ERECTILE DYSFUNCTION, UNSPECIFIED ERECTILE DYSFUNCTION TYPE: ICD-10-CM

## 2025-08-12 DIAGNOSIS — Z01.89 ROUTINE LAB DRAW: ICD-10-CM

## 2025-08-12 DIAGNOSIS — I10 ESSENTIAL HYPERTENSION: Primary | ICD-10-CM

## 2025-08-12 DIAGNOSIS — E78.2 MIXED HYPERLIPIDEMIA: ICD-10-CM

## 2025-08-12 PROCEDURE — 3074F SYST BP LT 130 MM HG: CPT | Performed by: NURSE PRACTITIONER

## 2025-08-12 PROCEDURE — 90677 PCV20 VACCINE IM: CPT | Performed by: NURSE PRACTITIONER

## 2025-08-12 PROCEDURE — 99214 OFFICE O/P EST MOD 30 MIN: CPT | Mod: 25 | Performed by: NURSE PRACTITIONER

## 2025-08-12 PROCEDURE — 3008F BODY MASS INDEX DOCD: CPT | Performed by: NURSE PRACTITIONER

## 2025-08-12 PROCEDURE — 90471 IMMUNIZATION ADMIN: CPT | Performed by: NURSE PRACTITIONER

## 2025-08-12 PROCEDURE — 3078F DIAST BP <80 MM HG: CPT | Performed by: NURSE PRACTITIONER

## 2025-08-12 ASSESSMENT — PATIENT HEALTH QUESTIONNAIRE - PHQ9: SUM OF ALL RESPONSES TO PHQ9 QUESTIONS 1 & 2: 0

## 2025-08-12 ASSESSMENT — ENCOUNTER SYMPTOMS
ABDOMINAL PAIN: 0
FEVER: 0
NAUSEA: 0
HEADACHES: 0
PALPITATIONS: 0
SHORTNESS OF BREATH: 0
WHEEZING: 0
VOMITING: 0
COUGH: 0
CONSTIPATION: 0
ABDOMINAL DISTENTION: 0
CHILLS: 0
DIARRHEA: 0